# Patient Record
Sex: MALE | Race: WHITE | NOT HISPANIC OR LATINO
[De-identification: names, ages, dates, MRNs, and addresses within clinical notes are randomized per-mention and may not be internally consistent; named-entity substitution may affect disease eponyms.]

---

## 2017-03-02 ENCOUNTER — APPOINTMENT (OUTPATIENT)
Dept: ORTHOPEDIC SURGERY | Facility: CLINIC | Age: 62
End: 2017-03-02

## 2017-03-06 ENCOUNTER — RESULT REVIEW (OUTPATIENT)
Age: 62
End: 2017-03-06

## 2017-03-21 ENCOUNTER — RX RENEWAL (OUTPATIENT)
Age: 62
End: 2017-03-21

## 2017-05-19 ENCOUNTER — OUTPATIENT (OUTPATIENT)
Dept: OUTPATIENT SERVICES | Facility: HOSPITAL | Age: 62
LOS: 1 days | End: 2017-05-19
Payer: COMMERCIAL

## 2017-05-19 ENCOUNTER — APPOINTMENT (OUTPATIENT)
Dept: MRI IMAGING | Facility: CLINIC | Age: 62
End: 2017-05-19

## 2017-05-19 DIAGNOSIS — Z00.8 ENCOUNTER FOR OTHER GENERAL EXAMINATION: ICD-10-CM

## 2017-05-19 DIAGNOSIS — Z98.89 OTHER SPECIFIED POSTPROCEDURAL STATES: Chronic | ICD-10-CM

## 2017-05-19 PROCEDURE — 72148 MRI LUMBAR SPINE W/O DYE: CPT | Mod: 26

## 2017-05-19 PROCEDURE — 72148 MRI LUMBAR SPINE W/O DYE: CPT

## 2017-06-02 ENCOUNTER — OUTPATIENT (OUTPATIENT)
Dept: OUTPATIENT SERVICES | Facility: HOSPITAL | Age: 62
LOS: 1 days | Discharge: HOME | End: 2017-06-02

## 2017-06-02 DIAGNOSIS — Z86.010 PERSONAL HISTORY OF COLONIC POLYPS: ICD-10-CM

## 2017-06-02 DIAGNOSIS — Z98.89 OTHER SPECIFIED POSTPROCEDURAL STATES: Chronic | ICD-10-CM

## 2017-06-02 LAB
ALBUMIN SERPL ELPH-MCNC: 3.8 G/DL — SIGNIFICANT CHANGE UP (ref 3.3–5)
ALP SERPL-CCNC: 83 U/L — SIGNIFICANT CHANGE UP (ref 40–120)
ALT FLD-CCNC: 49 U/L — SIGNIFICANT CHANGE UP (ref 12–78)
ANION GAP SERPL CALC-SCNC: 4 MMOL/L — LOW (ref 5–17)
AST SERPL-CCNC: 27 U/L — SIGNIFICANT CHANGE UP (ref 15–37)
BASOPHILS # BLD AUTO: 0.1 K/UL — SIGNIFICANT CHANGE UP (ref 0–0.2)
BASOPHILS NFR BLD AUTO: 1.3 % — SIGNIFICANT CHANGE UP (ref 0–2)
BILIRUB SERPL-MCNC: 0.8 MG/DL — SIGNIFICANT CHANGE UP (ref 0.2–1.2)
BUN SERPL-MCNC: 24 MG/DL — HIGH (ref 7–23)
CALCIUM SERPL-MCNC: 8.4 MG/DL — LOW (ref 8.5–10.1)
CHLORIDE SERPL-SCNC: 104 MMOL/L — SIGNIFICANT CHANGE UP (ref 96–108)
CO2 SERPL-SCNC: 32 MMOL/L — HIGH (ref 22–31)
CREAT SERPL-MCNC: 1.11 MG/DL — SIGNIFICANT CHANGE UP (ref 0.5–1.3)
EOSINOPHIL # BLD AUTO: 0.1 K/UL — SIGNIFICANT CHANGE UP (ref 0–0.5)
EOSINOPHIL NFR BLD AUTO: 1.6 % — SIGNIFICANT CHANGE UP (ref 0–6)
GLUCOSE SERPL-MCNC: 97 MG/DL — SIGNIFICANT CHANGE UP (ref 70–99)
HCT VFR BLD CALC: 46.3 % — SIGNIFICANT CHANGE UP (ref 39–50)
HGB BLD-MCNC: 16.1 G/DL — SIGNIFICANT CHANGE UP (ref 13–17)
LYMPHOCYTES # BLD AUTO: 1.7 K/UL — SIGNIFICANT CHANGE UP (ref 1–3.3)
LYMPHOCYTES # BLD AUTO: 19.6 % — SIGNIFICANT CHANGE UP (ref 13–44)
MCHC RBC-ENTMCNC: 33.8 PG — SIGNIFICANT CHANGE UP (ref 27–34)
MCHC RBC-ENTMCNC: 34.8 GM/DL — SIGNIFICANT CHANGE UP (ref 32–36)
MCV RBC AUTO: 97.2 FL — SIGNIFICANT CHANGE UP (ref 80–100)
MONOCYTES # BLD AUTO: 1 K/UL — HIGH (ref 0–0.9)
MONOCYTES NFR BLD AUTO: 11.6 % — SIGNIFICANT CHANGE UP (ref 2–14)
NEUTROPHILS # BLD AUTO: 5.8 K/UL — SIGNIFICANT CHANGE UP (ref 1.8–7.4)
NEUTROPHILS NFR BLD AUTO: 65.8 % — SIGNIFICANT CHANGE UP (ref 43–77)
PLATELET # BLD AUTO: 137 K/UL — LOW (ref 150–400)
POTASSIUM SERPL-MCNC: 4.5 MMOL/L — SIGNIFICANT CHANGE UP (ref 3.5–5.3)
POTASSIUM SERPL-SCNC: 4.5 MMOL/L — SIGNIFICANT CHANGE UP (ref 3.5–5.3)
PROT SERPL-MCNC: 6.8 GM/DL — SIGNIFICANT CHANGE UP (ref 6–8.3)
RBC # BLD: 4.76 M/UL — SIGNIFICANT CHANGE UP (ref 4.2–5.8)
RBC # FLD: 12.5 % — SIGNIFICANT CHANGE UP (ref 10.3–14.5)
SODIUM SERPL-SCNC: 140 MMOL/L — SIGNIFICANT CHANGE UP (ref 135–145)
WBC # BLD: 8.8 K/UL — SIGNIFICANT CHANGE UP (ref 3.8–10.5)
WBC # FLD AUTO: 8.8 K/UL — SIGNIFICANT CHANGE UP (ref 3.8–10.5)

## 2017-06-09 ENCOUNTER — RESULT REVIEW (OUTPATIENT)
Age: 62
End: 2017-06-09

## 2017-06-09 ENCOUNTER — OUTPATIENT (OUTPATIENT)
Dept: OUTPATIENT SERVICES | Facility: HOSPITAL | Age: 62
LOS: 1 days | Discharge: HOME | End: 2017-06-09
Payer: COMMERCIAL

## 2017-06-09 VITALS
TEMPERATURE: 98 F | HEIGHT: 69 IN | OXYGEN SATURATION: 96 % | SYSTOLIC BLOOD PRESSURE: 122 MMHG | HEART RATE: 71 BPM | DIASTOLIC BLOOD PRESSURE: 81 MMHG | WEIGHT: 182.98 LBS | RESPIRATION RATE: 21 BRPM

## 2017-06-09 DIAGNOSIS — Z98.89 OTHER SPECIFIED POSTPROCEDURAL STATES: Chronic | ICD-10-CM

## 2017-06-09 PROCEDURE — 88305 TISSUE EXAM BY PATHOLOGIST: CPT | Mod: 26

## 2017-06-09 RX ORDER — ASPIRIN/CALCIUM CARB/MAGNESIUM 324 MG
81 TABLET ORAL ONCE
Qty: 0 | Refills: 0 | Status: COMPLETED | OUTPATIENT
Start: 2017-06-09 | End: 2017-06-09

## 2017-06-09 RX ORDER — SODIUM CHLORIDE 9 MG/ML
1000 INJECTION INTRAMUSCULAR; INTRAVENOUS; SUBCUTANEOUS
Qty: 0 | Refills: 0 | Status: DISCONTINUED | OUTPATIENT
Start: 2017-06-09 | End: 2017-06-24

## 2017-06-09 RX ADMIN — Medication 81 MILLIGRAM(S): at 12:23

## 2017-06-09 NOTE — ASU PATIENT PROFILE, ADULT - PMH
Asthma  history of seasonal allergies  Cholesterol serum elevated    Elevated CPK  patient has history of elevated cpk in past. Patient monitored by PMD routinely . Last lab was in May level waswith in normal limits  Heart attack    Lethargy  patient takes nuvigil  Sleep apnea  uses c-pap

## 2017-06-13 DIAGNOSIS — K64.8 OTHER HEMORRHOIDS: ICD-10-CM

## 2017-06-13 DIAGNOSIS — Z95.5 PRESENCE OF CORONARY ANGIOPLASTY IMPLANT AND GRAFT: ICD-10-CM

## 2017-06-13 DIAGNOSIS — Z12.11 ENCOUNTER FOR SCREENING FOR MALIGNANT NEOPLASM OF COLON: ICD-10-CM

## 2017-06-13 DIAGNOSIS — K57.30 DIVERTICULOSIS OF LARGE INTESTINE WITHOUT PERFORATION OR ABSCESS WITHOUT BLEEDING: ICD-10-CM

## 2017-06-13 DIAGNOSIS — D12.0 BENIGN NEOPLASM OF CECUM: ICD-10-CM

## 2017-06-13 DIAGNOSIS — G47.33 OBSTRUCTIVE SLEEP APNEA (ADULT) (PEDIATRIC): ICD-10-CM

## 2017-06-13 DIAGNOSIS — D12.2 BENIGN NEOPLASM OF ASCENDING COLON: ICD-10-CM

## 2017-06-13 DIAGNOSIS — D12.3 BENIGN NEOPLASM OF TRANSVERSE COLON: ICD-10-CM

## 2017-06-13 DIAGNOSIS — I25.10 ATHEROSCLEROTIC HEART DISEASE OF NATIVE CORONARY ARTERY WITHOUT ANGINA PECTORIS: ICD-10-CM

## 2017-06-13 DIAGNOSIS — Z79.82 LONG TERM (CURRENT) USE OF ASPIRIN: ICD-10-CM

## 2017-06-13 DIAGNOSIS — I25.2 OLD MYOCARDIAL INFARCTION: ICD-10-CM

## 2017-06-13 LAB — SURGICAL PATHOLOGY FINAL REPORT - CH: SIGNIFICANT CHANGE UP

## 2017-09-21 ENCOUNTER — APPOINTMENT (OUTPATIENT)
Dept: ORTHOPEDIC SURGERY | Facility: CLINIC | Age: 62
End: 2017-09-21
Payer: COMMERCIAL

## 2017-09-21 PROCEDURE — 20550 NJX 1 TENDON SHEATH/LIGAMENT: CPT | Mod: RT

## 2017-09-21 PROCEDURE — 99214 OFFICE O/P EST MOD 30 MIN: CPT | Mod: 25

## 2017-11-09 ENCOUNTER — APPOINTMENT (OUTPATIENT)
Dept: ORTHOPEDIC SURGERY | Facility: CLINIC | Age: 62
End: 2017-11-09
Payer: COMMERCIAL

## 2017-11-09 PROCEDURE — 99214 OFFICE O/P EST MOD 30 MIN: CPT | Mod: 25

## 2017-11-09 PROCEDURE — 20550 NJX 1 TENDON SHEATH/LIGAMENT: CPT | Mod: F3

## 2018-03-01 ENCOUNTER — APPOINTMENT (OUTPATIENT)
Dept: ORTHOPEDIC SURGERY | Facility: CLINIC | Age: 63
End: 2018-03-01
Payer: COMMERCIAL

## 2018-03-01 PROCEDURE — 99214 OFFICE O/P EST MOD 30 MIN: CPT

## 2018-03-01 PROCEDURE — 73110 X-RAY EXAM OF WRIST: CPT | Mod: LT

## 2018-04-17 ENCOUNTER — APPOINTMENT (OUTPATIENT)
Dept: ORTHOPEDIC SURGERY | Facility: CLINIC | Age: 63
End: 2018-04-17
Payer: MEDICARE

## 2018-04-17 DIAGNOSIS — M18.11 UNILATERAL PRIMARY OSTEOARTHRITIS OF FIRST CARPOMETACARPAL JOINT, RIGHT HAND: ICD-10-CM

## 2018-04-17 DIAGNOSIS — M65.332 TRIGGER FINGER, LEFT MIDDLE FINGER: ICD-10-CM

## 2018-04-17 DIAGNOSIS — M65.331 TRIGGER FINGER, RIGHT MIDDLE FINGER: ICD-10-CM

## 2018-04-17 DIAGNOSIS — M18.12 UNILATERAL PRIMARY OSTEOARTHRITIS OF FIRST CARPOMETACARPAL JOINT, LEFT HAND: ICD-10-CM

## 2018-04-17 DIAGNOSIS — M65.342 TRIGGER FINGER, LEFT RING FINGER: ICD-10-CM

## 2018-04-17 PROCEDURE — 99213 OFFICE O/P EST LOW 20 MIN: CPT

## 2018-04-18 RX ORDER — ATORVASTATIN CALCIUM 80 MG/1
80 TABLET, FILM COATED ORAL
Qty: 30 | Refills: 0 | Status: ACTIVE | COMMUNITY
Start: 2018-03-02

## 2018-06-15 ENCOUNTER — TRANSCRIPTION ENCOUNTER (OUTPATIENT)
Age: 63
End: 2018-06-15

## 2018-06-15 ENCOUNTER — INPATIENT (INPATIENT)
Facility: HOSPITAL | Age: 63
LOS: 0 days | Discharge: HOME | DRG: 247 | End: 2018-06-16
Attending: INTERNAL MEDICINE | Admitting: INTERNAL MEDICINE
Payer: MEDICARE

## 2018-06-15 VITALS
SYSTOLIC BLOOD PRESSURE: 124 MMHG | HEART RATE: 67 BPM | TEMPERATURE: 98 F | RESPIRATION RATE: 18 BRPM | HEIGHT: 69 IN | WEIGHT: 179.9 LBS | DIASTOLIC BLOOD PRESSURE: 90 MMHG | OXYGEN SATURATION: 97 %

## 2018-06-15 DIAGNOSIS — Z98.89 OTHER SPECIFIED POSTPROCEDURAL STATES: Chronic | ICD-10-CM

## 2018-06-15 DIAGNOSIS — I25.10 ATHEROSCLEROTIC HEART DISEASE OF NATIVE CORONARY ARTERY WITHOUT ANGINA PECTORIS: ICD-10-CM

## 2018-06-15 LAB
ALBUMIN SERPL ELPH-MCNC: 4.3 G/DL — SIGNIFICANT CHANGE UP (ref 3.3–5)
ALP SERPL-CCNC: 103 U/L — SIGNIFICANT CHANGE UP (ref 40–120)
ALT FLD-CCNC: 40 U/L — SIGNIFICANT CHANGE UP (ref 10–45)
ANION GAP SERPL CALC-SCNC: 11 MMOL/L — SIGNIFICANT CHANGE UP (ref 5–17)
ANION GAP SERPL CALC-SCNC: 12 MMOL/L — SIGNIFICANT CHANGE UP (ref 5–17)
AST SERPL-CCNC: 38 U/L — SIGNIFICANT CHANGE UP (ref 10–40)
BILIRUB SERPL-MCNC: 0.6 MG/DL — SIGNIFICANT CHANGE UP (ref 0.2–1.2)
BUN SERPL-MCNC: 26 MG/DL — HIGH (ref 7–23)
BUN SERPL-MCNC: 26 MG/DL — HIGH (ref 7–23)
CALCIUM SERPL-MCNC: 8.7 MG/DL — SIGNIFICANT CHANGE UP (ref 8.4–10.5)
CALCIUM SERPL-MCNC: 9.3 MG/DL — SIGNIFICANT CHANGE UP (ref 8.4–10.5)
CHLORIDE SERPL-SCNC: 103 MMOL/L — SIGNIFICANT CHANGE UP (ref 96–108)
CHLORIDE SERPL-SCNC: 103 MMOL/L — SIGNIFICANT CHANGE UP (ref 96–108)
CO2 SERPL-SCNC: 26 MMOL/L — SIGNIFICANT CHANGE UP (ref 22–31)
CO2 SERPL-SCNC: 27 MMOL/L — SIGNIFICANT CHANGE UP (ref 22–31)
CREAT SERPL-MCNC: 1.11 MG/DL — SIGNIFICANT CHANGE UP (ref 0.5–1.3)
CREAT SERPL-MCNC: 1.2 MG/DL — SIGNIFICANT CHANGE UP (ref 0.5–1.3)
GLUCOSE SERPL-MCNC: 103 MG/DL — HIGH (ref 70–99)
GLUCOSE SERPL-MCNC: 106 MG/DL — HIGH (ref 70–99)
HCT VFR BLD CALC: 47.6 % — SIGNIFICANT CHANGE UP (ref 39–50)
HGB BLD-MCNC: 15.9 G/DL — SIGNIFICANT CHANGE UP (ref 13–17)
MCHC RBC-ENTMCNC: 33.3 PG — SIGNIFICANT CHANGE UP (ref 27–34)
MCHC RBC-ENTMCNC: 33.4 GM/DL — SIGNIFICANT CHANGE UP (ref 32–36)
MCV RBC AUTO: 99.9 FL — SIGNIFICANT CHANGE UP (ref 80–100)
PLATELET # BLD AUTO: 157 K/UL — SIGNIFICANT CHANGE UP (ref 150–400)
POTASSIUM SERPL-MCNC: 4.5 MMOL/L — SIGNIFICANT CHANGE UP (ref 3.5–5.3)
POTASSIUM SERPL-MCNC: 4.6 MMOL/L — SIGNIFICANT CHANGE UP (ref 3.5–5.3)
POTASSIUM SERPL-SCNC: 4.5 MMOL/L — SIGNIFICANT CHANGE UP (ref 3.5–5.3)
POTASSIUM SERPL-SCNC: 4.6 MMOL/L — SIGNIFICANT CHANGE UP (ref 3.5–5.3)
PROT SERPL-MCNC: 7.5 G/DL — SIGNIFICANT CHANGE UP (ref 6–8.3)
RBC # BLD: 4.77 M/UL — SIGNIFICANT CHANGE UP (ref 4.2–5.8)
RBC # FLD: 12.9 % — SIGNIFICANT CHANGE UP (ref 10.3–14.5)
SODIUM SERPL-SCNC: 140 MMOL/L — SIGNIFICANT CHANGE UP (ref 135–145)
SODIUM SERPL-SCNC: 142 MMOL/L — SIGNIFICANT CHANGE UP (ref 135–145)
WBC # BLD: 9.3 K/UL — SIGNIFICANT CHANGE UP (ref 3.8–10.5)
WBC # FLD AUTO: 9.3 K/UL — SIGNIFICANT CHANGE UP (ref 3.8–10.5)

## 2018-06-15 PROCEDURE — 93010 ELECTROCARDIOGRAM REPORT: CPT

## 2018-06-15 PROCEDURE — 92928 PRQ TCAT PLMT NTRAC ST 1 LES: CPT | Mod: RC

## 2018-06-15 PROCEDURE — 93458 L HRT ARTERY/VENTRICLE ANGIO: CPT | Mod: 26,59

## 2018-06-15 PROCEDURE — 99152 MOD SED SAME PHYS/QHP 5/>YRS: CPT

## 2018-06-15 RX ORDER — ATORVASTATIN CALCIUM 80 MG/1
1 TABLET, FILM COATED ORAL
Qty: 0 | Refills: 0 | COMMUNITY

## 2018-06-15 RX ORDER — METOPROLOL TARTRATE 50 MG
25 TABLET ORAL
Qty: 0 | Refills: 0 | Status: DISCONTINUED | OUTPATIENT
Start: 2018-06-15 | End: 2018-06-15

## 2018-06-15 RX ORDER — ALBUTEROL 90 UG/1
2 AEROSOL, METERED ORAL EVERY 6 HOURS
Qty: 0 | Refills: 0 | Status: DISCONTINUED | OUTPATIENT
Start: 2018-06-15 | End: 2018-06-16

## 2018-06-15 RX ORDER — AMOXICILLIN 250 MG/5ML
500 SUSPENSION, RECONSTITUTED, ORAL (ML) ORAL THREE TIMES A DAY
Qty: 0 | Refills: 0 | Status: DISCONTINUED | OUTPATIENT
Start: 2018-06-15 | End: 2018-06-16

## 2018-06-15 RX ORDER — AMOXICILLIN 250 MG/5ML
1 SUSPENSION, RECONSTITUTED, ORAL (ML) ORAL
Qty: 0 | Refills: 0 | COMMUNITY

## 2018-06-15 RX ORDER — METOPROLOL TARTRATE 50 MG
25 TABLET ORAL DAILY
Qty: 0 | Refills: 0 | Status: DISCONTINUED | OUTPATIENT
Start: 2018-06-15 | End: 2018-06-15

## 2018-06-15 RX ORDER — METOPROLOL TARTRATE 50 MG
12.5 TABLET ORAL DAILY
Qty: 0 | Refills: 0 | Status: DISCONTINUED | OUTPATIENT
Start: 2018-06-15 | End: 2018-06-15

## 2018-06-15 RX ORDER — MONTELUKAST 4 MG/1
10 TABLET, CHEWABLE ORAL DAILY
Qty: 0 | Refills: 0 | Status: DISCONTINUED | OUTPATIENT
Start: 2018-06-15 | End: 2018-06-16

## 2018-06-15 RX ORDER — METOPROLOL TARTRATE 50 MG
1 TABLET ORAL
Qty: 0 | Refills: 0 | COMMUNITY

## 2018-06-15 RX ORDER — ASPIRIN/CALCIUM CARB/MAGNESIUM 324 MG
81 TABLET ORAL DAILY
Qty: 0 | Refills: 0 | Status: DISCONTINUED | OUTPATIENT
Start: 2018-06-15 | End: 2018-06-16

## 2018-06-15 RX ORDER — MOMETASONE FUROATE 220 UG/1
2 INHALANT RESPIRATORY (INHALATION) DAILY
Qty: 0 | Refills: 0 | Status: DISCONTINUED | OUTPATIENT
Start: 2018-06-15 | End: 2018-06-16

## 2018-06-15 RX ORDER — ATORVASTATIN CALCIUM 80 MG/1
1 TABLET, FILM COATED ORAL
Qty: 30 | Refills: 1
Start: 2018-06-15 | End: 2018-08-13

## 2018-06-15 RX ORDER — METOPROLOL TARTRATE 50 MG
12.5 TABLET ORAL
Qty: 0 | Refills: 0 | Status: DISCONTINUED | OUTPATIENT
Start: 2018-06-15 | End: 2018-06-16

## 2018-06-15 RX ORDER — CLOPIDOGREL BISULFATE 75 MG/1
75 TABLET, FILM COATED ORAL DAILY
Qty: 0 | Refills: 0 | Status: DISCONTINUED | OUTPATIENT
Start: 2018-06-16 | End: 2018-06-16

## 2018-06-15 RX ORDER — FLUTICASONE PROPIONATE AND SALMETEROL 50; 250 UG/1; UG/1
0 POWDER ORAL; RESPIRATORY (INHALATION)
Qty: 0 | Refills: 0 | COMMUNITY

## 2018-06-15 RX ORDER — ATORVASTATIN CALCIUM 80 MG/1
80 TABLET, FILM COATED ORAL AT BEDTIME
Qty: 0 | Refills: 0 | Status: DISCONTINUED | OUTPATIENT
Start: 2018-06-15 | End: 2018-06-16

## 2018-06-15 RX ORDER — ASPIRIN/CALCIUM CARB/MAGNESIUM 324 MG
1 TABLET ORAL
Qty: 30 | Refills: 2
Start: 2018-06-15 | End: 2018-09-12

## 2018-06-15 RX ORDER — OXYCODONE AND ACETAMINOPHEN 5; 325 MG/1; MG/1
1 TABLET ORAL ONCE
Qty: 0 | Refills: 0 | Status: DISCONTINUED | OUTPATIENT
Start: 2018-06-15 | End: 2018-06-15

## 2018-06-15 RX ORDER — CLOPIDOGREL BISULFATE 75 MG/1
1 TABLET, FILM COATED ORAL
Qty: 30 | Refills: 11
Start: 2018-06-15 | End: 2019-06-09

## 2018-06-15 RX ORDER — FAMOTIDINE 10 MG/ML
20 INJECTION INTRAVENOUS DAILY
Qty: 0 | Refills: 0 | Status: DISCONTINUED | OUTPATIENT
Start: 2018-06-15 | End: 2018-06-16

## 2018-06-15 RX ADMIN — Medication 12.5 MILLIGRAM(S): at 17:50

## 2018-06-15 RX ADMIN — Medication 500 MILLIGRAM(S): at 17:51

## 2018-06-15 RX ADMIN — Medication 30 MILLILITER(S): at 16:34

## 2018-06-15 RX ADMIN — ATORVASTATIN CALCIUM 80 MILLIGRAM(S): 80 TABLET, FILM COATED ORAL at 20:31

## 2018-06-15 NOTE — H&P CARDIOLOGY - FAMILY HISTORY
Father  Still living? Unknown  Family history of sudden death in father, Age at diagnosis: Age Unknown

## 2018-06-15 NOTE — DISCHARGE NOTE ADULT - PLAN OF CARE
Pt remains chest pain free and understands post cath discharge instructions No heavy lifting or pushing/pulling with procedure arm for 2 weeks. No driving for 2 days. You may shower 24 hours following the procedure but avoid baths/swimming for 1 week. Check your wrist site for bleeding and/or swelling daily following procedure and call your doctor immediately if it occurs or if you experience increased pain at the site. Follow up with your cardiologist in 1-2 weeks. You may call [facility] if you have any questions/concerns regarding your procedure [phone]. Your LDL cholesterol will be less than 70mg/dL Continue with your cholesterol medications. Eat a heart healthy diet that is low in saturated fats and salt, and includes whole grains, fruits, vegetables and lean protein; exercise regularly (consult with your physician or cardiologist first); maintain a heart healthy weight. Continue to follow with your primary physician or cardiologist for treatment goals, continue medication, have liver function testing every 3 months as anti lipid medications can cause liver irritation. If you smoke - quit (A resource to help you stop smoking is the [facility] – phone number [phone].).

## 2018-06-15 NOTE — H&P CARDIOLOGY - PMH
Asthma    Cholesterol serum elevated    Coronary artery disease due to lipid rich plaque    Lethargy  patient takes nuvigil  Sleep apnea  uses c-pap  ST elevation myocardial infarction (STEMI), unspecified artery    Stented coronary artery

## 2018-06-15 NOTE — DISCHARGE NOTE ADULT - CARE PLAN
Principal Discharge DX:	CAD (coronary artery disease)  Goal:	Pt remains chest pain free and understands post cath discharge instructions  Assessment and plan of treatment:	No heavy lifting or pushing/pulling with procedure arm for 2 weeks. No driving for 2 days. You may shower 24 hours following the procedure but avoid baths/swimming for 1 week. Check your wrist site for bleeding and/or swelling daily following procedure and call your doctor immediately if it occurs or if you experience increased pain at the site. Follow up with your cardiologist in 1-2 weeks. You may call [facility] if you have any questions/concerns regarding your procedure [phone].  Secondary Diagnosis:	Hyperlipidemia  Goal:	Your LDL cholesterol will be less than 70mg/dL  Assessment and plan of treatment:	Continue with your cholesterol medications. Eat a heart healthy diet that is low in saturated fats and salt, and includes whole grains, fruits, vegetables and lean protein; exercise regularly (consult with your physician or cardiologist first); maintain a heart healthy weight. Continue to follow with your primary physician or cardiologist for treatment goals, continue medication, have liver function testing every 3 months as anti lipid medications can cause liver irritation. If you smoke - quit (A resource to help you stop smoking is the [facility] – phone number [phone].).

## 2018-06-15 NOTE — DISCHARGE NOTE ADULT - PATIENT PORTAL LINK FT
You can access the [facility], offered by [facility], by registering with the following website: [email or web address]

## 2018-06-15 NOTE — DISCHARGE NOTE ADULT - MEDICATION SUMMARY - MEDICATIONS TO TAKE
I will START or STAY ON the medications listed below when I get home from the hospital:    aspirin 81 mg oral delayed release tablet  -- 1 tab(s) by mouth once a day  -- Indication: For TO KEEP STENT OPEN    atorvastatin 80 mg oral tablet  -- 1 tab(s) by mouth once a day  -- Indication: For High cholesterol     clopidogrel 75 mg oral tablet  -- 1 tab(s) by mouth once a day  -- Indication: For TO KEEP STENT OPEN     Metoprolol Succinate ER 25 mg oral tablet, extended release  -- 0.5 tab(s) by mouth 2 times a day  -- Indication: For High blood pressure     albuterol 90 mcg/inh inhalation aerosol  -- 2 puff(s) inhaled every 4 hours, As Needed  -- Indication: For Asthma     Nuvigil 150 mg oral tablet  -- 0.5 tab(s) by mouth once a day  -- Indication: For Sleepiness    montelukast 10 mg oral tablet  -- 1 tab(s) by mouth once a day  -- Indication: For Asthma    Asmanex Twisthaler 120 Dose 220 mcg/inh inhalation aerosol powder  -- 2 puff(s) inhaled once a day  -- Indication: For Asthma

## 2018-06-15 NOTE — H&P CARDIOLOGY - HISTORY OF PRESENT ILLNESS
62 y/o male with PMHx of Asthma - never intubated, HLD, CAROLYN on CPAP, Chronic Lethargy, CAD - STEMI in 2016 s/p GAURAV to the [facility], cath at that time showed nonobstructive disease to the LAD and RCA who was evaluated by Cardiology - Dr. Conde on 6/14/2018 2/2 c/o episodes of sharp midsternal chest pain (waking him out of his sleep at times) with onset 3 weeks ago.  Patient states pain usually lasts about an hour, he denies accompanying symptoms.  Given patient hx decision was made to proceed for [facility] for which he presents today. 64 y/o male with PMHx of Asthma - never intubated, HLD, CAROLYN on CPAP, Chronic Lethargy, CAD - STEMI in 2016 s/p GAURAV to the [facility], cath at that time showed nonobstructive disease to the LAD and RCA who was evaluated by Cardiology - Dr. Conde on 6/14/2018 2/2 c/o episodes of sharp midsternal chest pain (waking him out of his sleep at times) with onset 3 weeks ago.  Patient states pain usually lasts about an hour, he denies accompanying symptoms.  Given patient hx decision was made to proceed for [facility] for which he presents today.        Of note pt had tooth extracted 6/13/2018 and is on Amoxicillin 500 mg po bid x 3 days

## 2018-06-15 NOTE — DISCHARGE NOTE ADULT - ADDITIONAL INSTRUCTIONS
Follow-up with your cardiologist inn 1-2 weeks   Do not stop you Aspirin or Plavix unless instructed to do so by your cardiologist.

## 2018-06-15 NOTE — DISCHARGE NOTE ADULT - CARE PROVIDER_API CALL
Indra Conde (MD), Cardiovascular Disease  [address]  [address] [place] [address]  Phone: [phone]  Fax: [phone]

## 2018-06-15 NOTE — DISCHARGE NOTE ADULT - HOSPITAL COURSE
62 y/o male with PMHx of Asthma - never intubated, HLD, CAROLYN on CPAP, Chronic Lethargy, CAD - STEMI in 2016 s/p GAURAV to the [facility], cath at that time showed nonobstructive disease to the LAD and RCA who was evaluated by Cardiology - Dr. Conde on 6/14/2018 2/2 c/o episodes of sharp midsternal chest pain (waking him out of his sleep at times) with onset 3 weeks ago.  Patient states pain usually lasts about an hour, he denies accompanying symptoms.  Given patient hx decision was made to proceed for [facility] for which he presents today. Pt is now s/p cardiac cath GAURAV x 1mLAD and GAURAV x 1 pRCA via right radial artery access. Pt tolerated the procedure well, cardiac cath site benign. Post-procedure discharge instructions discussed and questions addressed.      Of note pt had tooth extracted 6/13/2018 and is on Amoxicillin 500 mg po bid x 3 days

## 2018-06-15 NOTE — CHART NOTE - NSCHARTNOTEFT_GEN_A_CORE
Patient underwent a PCI procedure and is being admitted as they are at increased risk for major adverse cardiac and vascular events if discharged due to the following high risk characteristics: ACS/UA, performed >=2 vessel system PCI; no reflow requiring treatment.

## 2018-06-16 VITALS — HEART RATE: 76 BPM | OXYGEN SATURATION: 96 %

## 2018-06-16 LAB
HCT VFR BLD CALC: 43.3 % — SIGNIFICANT CHANGE UP (ref 39–50)
HGB BLD-MCNC: 15 G/DL — SIGNIFICANT CHANGE UP (ref 13–17)
MCHC RBC-ENTMCNC: 34.6 PG — HIGH (ref 27–34)
MCHC RBC-ENTMCNC: 34.7 GM/DL — SIGNIFICANT CHANGE UP (ref 32–36)
MCV RBC AUTO: 99.7 FL — SIGNIFICANT CHANGE UP (ref 80–100)
PLATELET # BLD AUTO: 146 K/UL — LOW (ref 150–400)
RBC # BLD: 4.34 M/UL — SIGNIFICANT CHANGE UP (ref 4.2–5.8)
RBC # FLD: 12.8 % — SIGNIFICANT CHANGE UP (ref 10.3–14.5)
WBC # BLD: 9 K/UL — SIGNIFICANT CHANGE UP (ref 3.8–10.5)
WBC # FLD AUTO: 9 K/UL — SIGNIFICANT CHANGE UP (ref 3.8–10.5)

## 2018-06-16 PROCEDURE — C1725: CPT

## 2018-06-16 PROCEDURE — 99152 MOD SED SAME PHYS/QHP 5/>YRS: CPT

## 2018-06-16 PROCEDURE — 93005 ELECTROCARDIOGRAM TRACING: CPT

## 2018-06-16 PROCEDURE — 80048 BASIC METABOLIC PNL TOTAL CA: CPT

## 2018-06-16 PROCEDURE — C1874: CPT

## 2018-06-16 PROCEDURE — 99153 MOD SED SAME PHYS/QHP EA: CPT

## 2018-06-16 PROCEDURE — 93458 L HRT ARTERY/VENTRICLE ANGIO: CPT | Mod: 59

## 2018-06-16 PROCEDURE — 85027 COMPLETE CBC AUTOMATED: CPT

## 2018-06-16 PROCEDURE — C1769: CPT

## 2018-06-16 PROCEDURE — 94660 CPAP INITIATION&MGMT: CPT

## 2018-06-16 PROCEDURE — C1887: CPT

## 2018-06-16 PROCEDURE — C9600: CPT | Mod: LD

## 2018-06-16 PROCEDURE — C1894: CPT

## 2018-06-16 PROCEDURE — 80053 COMPREHEN METABOLIC PANEL: CPT

## 2018-06-16 RX ADMIN — Medication 81 MILLIGRAM(S): at 05:53

## 2018-06-16 RX ADMIN — Medication 12.5 MILLIGRAM(S): at 05:52

## 2018-06-16 RX ADMIN — Medication 500 MILLIGRAM(S): at 04:00

## 2018-06-16 RX ADMIN — CLOPIDOGREL BISULFATE 75 MILLIGRAM(S): 75 TABLET, FILM COATED ORAL at 05:53

## 2018-06-16 RX ADMIN — FAMOTIDINE 20 MILLIGRAM(S): 10 INJECTION INTRAVENOUS at 05:54

## 2018-06-16 NOTE — PROGRESS NOTE ADULT - SUBJECTIVE AND OBJECTIVE BOX
Patient is a 63y old  Male who presents with a chief complaint of chest pain (15 Héctor 2018 20:21)          Allergies    dust (Unknown)  hay fever (Unknown)  No Known Drug Allergies    Intolerances        Medications:  ALBUTerol    90 MICROgram(s) HFA Inhaler 2 Puff(s) Inhalation every 6 hours PRN  amoxicillin      Capsule 500 milliGRAM(s) Oral three times a day  aspirin enteric coated 81 milliGRAM(s) Oral daily  atorvastatin 80 milliGRAM(s) Oral at bedtime  clopidogrel Tablet 75 milliGRAM(s) Oral daily  famotidine    Tablet 20 milliGRAM(s) Oral daily  metoprolol succinate ER 12.5 milliGRAM(s) Oral two times a day  mometasone 220 MICROgram(s) Inhaler 2 Puff(s) Inhalation daily  montelukast 10 milliGRAM(s) Oral daily      Vitals:  T(C): 36.6 (06-15-18 @ 12:09), Max: 36.6 (06-15-18 @ 12:09)  HR: 73 (06-15-18 @ 19:35) (67 - 82)  BP: 117/75 (06-15-18 @ 19:35) (93/72 - 135/80)  BP(mean): 101 (06-15-18 @ 12:09) (101 - 101)  RR: 16 (06-15-18 @ 19:35) (16 - 18)  SpO2: 97% (06-15-18 @ 19:35) (96% - 97%)  Wt(kg): --  Daily Height in cm: 175.26 (15 Héctor 2018 12:09)    Daily Weight in k.6 (15 Héctor 2018 12:09)  I&O's Summary    15 Héctor 2018 07:01  -  2018 01:07  --------------------------------------------------------  IN: 480 mL / OUT: 0 mL / NET: 480 mL          Physical Exam:  Appearance: Normal  Eyes: PERRL, EOMI  Procedural Access Site: Right radial artery access site. No hematoma, Non-tender to palpation, 2+ pulse, No bruit, No Ecchymosis  Musculoskeletal: No clubbing, No joint deformity   Neurologic: Non-focal  Psychiatry: AAOx3, Mood & affect appropriate  Skin: No rashes, No ecchymoses, No cyanosis    06-15    140  |  103  |  26<H>  ----------------------------<  106<H>  4.5   |  26  |  1.11    Ca    8.7      15 Héctor 2018 23:31    TPro  7.5  /  Alb  4.3  /  TBili  0.6  /  DBili  x   /  AST  38  /  ALT  40  /  AlkPhos  103  06-15            Interpretation of Telemetry: 66 bpm    ASSESSMENT/PLAN  Patient is a 63y old  Male who presents with a chief complaint of chest pain (15 Héctor 2018 20:21) now s/p mLAD (80%) GAURAV x 1  pRCA (70%) GAURAV x 1. Pt tolerated the procedure well. Post-procedure discharge instruction discussed and questions address. Patient is a 63y old  Male who presents with a chief complaint of chest pain (15 Héctor 2018 20:21) now s/p LHC GAURAV x 1 mLAD and GAURAV x 1 pRCA via right radial access.           Allergies    dust (Unknown)  hay fever (Unknown)  No Known Drug Allergies    Intolerances        Medications:  ALBUTerol    90 MICROgram(s) HFA Inhaler 2 Puff(s) Inhalation every 6 hours PRN  amoxicillin      Capsule 500 milliGRAM(s) Oral three times a day  aspirin enteric coated 81 milliGRAM(s) Oral daily  atorvastatin 80 milliGRAM(s) Oral at bedtime  clopidogrel Tablet 75 milliGRAM(s) Oral daily  famotidine    Tablet 20 milliGRAM(s) Oral daily  metoprolol succinate ER 12.5 milliGRAM(s) Oral two times a day  mometasone 220 MICROgram(s) Inhaler 2 Puff(s) Inhalation daily  montelukast 10 milliGRAM(s) Oral daily      Vitals:  T(C): 36.6 (06-15-18 @ 12:09), Max: 36.6 (06-15-18 @ 12:09)  HR: 73 (06-15-18 @ 19:35) (67 - 82)  BP: 117/75 (06-15-18 @ 19:35) (93/72 - 135/80)  BP(mean): 101 (06-15-18 @ 12:09) (101 - 101)  RR: 16 (06-15-18 @ 19:35) (16 - 18)  SpO2: 97% (06-15-18 @ 19:35) (96% - 97%)  Wt(kg): --  Daily Height in cm: 175.26 (15 Héctor 2018 12:09)    Daily Weight in k.6 (15 Héctor 2018 12:09)  I&O's Summary    15 Héctor 2018 07:01  -  2018 01:07  --------------------------------------------------------  IN: 480 mL / OUT: 0 mL / NET: 480 mL          Physical Exam:  Appearance: Normal  Eyes: PERRL, EOMI  Procedural Access Site: Right radial artery access site. No hematoma, Non-tender to palpation, 2+ pulse, No bruit, No Ecchymosis  Musculoskeletal: No clubbing, No joint deformity   Neurologic: Non-focal  Psychiatry: AAOx3, Mood & affect appropriate  Skin: No rashes, No ecchymoses, No cyanosis    06-15    140  |  103  |  26<H>  ----------------------------<  106<H>  4.5   |  26  |  1.11    Ca    8.7      15 Héctor 2018 23:31    TPro  7.5  /  Alb  4.3  /  TBili  0.6  /  DBili  x   /  AST  38  /  ALT  40  /  AlkPhos  103  06-15            Interpretation of Telemetry: 66 bpm    ASSESSMENT/PLAN  Patient is a 63y old  Male who presents with a chief complaint of chest pain (15 Héctor 2018 20:21) now s/p mLAD (80%) GAURAV x 1  pRCA (70%) GAURAV x 1 via riight radial artery access. Pt tolerated the procedure well, cath site benign. Post-procedure discharge instruction discussed and questions address.

## 2019-06-06 ENCOUNTER — APPOINTMENT (OUTPATIENT)
Dept: MRI IMAGING | Facility: CLINIC | Age: 64
End: 2019-06-06
Payer: MEDICARE

## 2019-06-06 ENCOUNTER — OUTPATIENT (OUTPATIENT)
Dept: OUTPATIENT SERVICES | Facility: HOSPITAL | Age: 64
LOS: 1 days | End: 2019-06-06
Payer: MEDICARE

## 2019-06-06 DIAGNOSIS — Z98.89 OTHER SPECIFIED POSTPROCEDURAL STATES: Chronic | ICD-10-CM

## 2019-06-06 DIAGNOSIS — Z00.8 ENCOUNTER FOR OTHER GENERAL EXAMINATION: ICD-10-CM

## 2019-06-06 PROCEDURE — 72148 MRI LUMBAR SPINE W/O DYE: CPT | Mod: 26

## 2019-06-06 PROCEDURE — 72148 MRI LUMBAR SPINE W/O DYE: CPT

## 2019-10-25 ENCOUNTER — TRANSCRIPTION ENCOUNTER (OUTPATIENT)
Age: 64
End: 2019-10-25

## 2020-06-23 ENCOUNTER — APPOINTMENT (OUTPATIENT)
Dept: ULTRASOUND IMAGING | Facility: CLINIC | Age: 65
End: 2020-06-23
Payer: MEDICARE

## 2020-06-23 ENCOUNTER — OUTPATIENT (OUTPATIENT)
Dept: OUTPATIENT SERVICES | Facility: HOSPITAL | Age: 65
LOS: 1 days | End: 2020-06-23
Payer: MEDICARE

## 2020-06-23 DIAGNOSIS — Z00.8 ENCOUNTER FOR OTHER GENERAL EXAMINATION: ICD-10-CM

## 2020-06-23 DIAGNOSIS — Z98.89 OTHER SPECIFIED POSTPROCEDURAL STATES: Chronic | ICD-10-CM

## 2020-06-23 PROCEDURE — 76700 US EXAM ABDOM COMPLETE: CPT | Mod: 26

## 2020-06-23 PROCEDURE — 76700 US EXAM ABDOM COMPLETE: CPT

## 2020-06-25 ENCOUNTER — RECORD ABSTRACTING (OUTPATIENT)
Age: 65
End: 2020-06-25

## 2020-08-24 ENCOUNTER — APPOINTMENT (OUTPATIENT)
Dept: PEDIATRIC ALLERGY IMMUNOLOGY | Facility: CLINIC | Age: 65
End: 2020-08-24
Payer: MEDICARE

## 2020-08-24 VITALS — OXYGEN SATURATION: 97 % | RESPIRATION RATE: 18 BRPM | HEART RATE: 85 BPM

## 2020-08-24 DIAGNOSIS — G47.9 SLEEP DISORDER, UNSPECIFIED: ICD-10-CM

## 2020-08-24 PROCEDURE — 99214 OFFICE O/P EST MOD 30 MIN: CPT

## 2020-08-24 NOTE — REVIEW OF SYSTEMS
[Nasal Congestion] : nasal congestion [Difficulty Breathing] : dyspnea [Fatigue] : fatigue [Nl] : Integumentary [FreeTextEntry8] : Sleep Disturbances

## 2020-08-24 NOTE — HISTORY OF PRESENT ILLNESS
[Allergic Rhinitis] : allergic rhinitis [Eczematous rashes] : eczematous rashes [de-identified] : 65 year old with mild-moderate persistent asthma and long standing history of sleep apnea and sleep disorders (? narcolepsy) .  He currently is on Nuvigil which in the past has enabled him to sleep but he continually complains of daytime sleepiness. He has vague complains of ?? difficulty breathing but without wheezing, shortness of breath or cough.  It is unclear whether this represents daytime fatigue or actual airway disfunction. He is supposed to be using Arnuity 200 1p qd along with Singulair 10 mg qd but compliance is questionable.  He use ProAir PRN when he feels these respiratory complains but its effect is unclear.  He uses Flonase 2s qd but again compliance is poor..  Complaints are complicated by past history of cardiac CAD and MI-he currently takes metoprolol and Lipitor. He has sleep apnea and sleeps with CPAP

## 2020-08-24 NOTE — PHYSICAL EXAM
[Alert] : alert [Normal Pupil & Iris Size/Symmetry] : normal pupil and iris size and symmetry [No Acute Distress] : no acute distress [Sclera Not Icteric] : sclera not icteric [Wheezing] : no wheezing was heard [No Thrush] : no thrush [Normal TMs] : both tympanic membranes were normal [Normal Rate] : heart rate was normal  [Regular Rhythm] : with a regular rhythm [Normal Cervical Lymph Nodes] : cervical [Skin Intact] : skin intact

## 2020-08-24 NOTE — ASSESSMENT
[FreeTextEntry1] : 65 yr old with sleep apnea and other sleep disturbances now and persistently compalint of day time sleepiness and a vague sensation of difficulty breathing intermittently.  there is no wheezing, cough, shortness of breath that would suggest asthma although the patinet has had astma in past. There are no nocturnal awakening from wheezing and exercise is well tolerated.\par \par Suggest better compliance with asthma medications such as Arnuity. ProAir will be taken PRN. I have also suggested pt begin home PEFR monitoring to see if respiratory complaints are associated with a drop in peak flow at home He is to continue his Singualir as well\par \par Will follow up 6 weeks.\par \par Hao Smith MD, FAAP, FAAAAI\par [facility] All[facility], Asthma, & Immunology\par [facility]\par [facility]\par [facility] at [place]\par [address] [facility] [address], [place]  [address]\par [address] [place] [address], [address] [place] [address]  [address]\par [phone]\par

## 2020-08-24 NOTE — REASON FOR VISIT
[Routine Follow-Up] : a routine follow-up visit for [Asthma] : asthma [Shortness of Breath] : shortness of breath [Allergy Evaluation/ Skin Testing] : allergy evaluation and or skin testing

## 2020-09-21 ENCOUNTER — OUTPATIENT (OUTPATIENT)
Dept: OUTPATIENT SERVICES | Facility: HOSPITAL | Age: 65
LOS: 1 days | End: 2020-09-21
Payer: MEDICARE

## 2020-09-21 DIAGNOSIS — Z01.818 ENCOUNTER FOR OTHER PREPROCEDURAL EXAMINATION: ICD-10-CM

## 2020-09-21 DIAGNOSIS — Z98.89 OTHER SPECIFIED POSTPROCEDURAL STATES: Chronic | ICD-10-CM

## 2020-09-21 DIAGNOSIS — Z86.010 PERSONAL HISTORY OF COLONIC POLYPS: ICD-10-CM

## 2020-09-21 LAB
ANION GAP SERPL CALC-SCNC: 1 MMOL/L — LOW (ref 5–17)
BASOPHILS # BLD AUTO: 0.05 K/UL — SIGNIFICANT CHANGE UP (ref 0–0.2)
BASOPHILS NFR BLD AUTO: 0.5 % — SIGNIFICANT CHANGE UP (ref 0–2)
BUN SERPL-MCNC: 21 MG/DL — SIGNIFICANT CHANGE UP (ref 7–23)
CALCIUM SERPL-MCNC: 8.7 MG/DL — SIGNIFICANT CHANGE UP (ref 8.5–10.1)
CHLORIDE SERPL-SCNC: 108 MMOL/L — SIGNIFICANT CHANGE UP (ref 96–108)
CO2 SERPL-SCNC: 31 MMOL/L — SIGNIFICANT CHANGE UP (ref 22–31)
CREAT SERPL-MCNC: 1.16 MG/DL — SIGNIFICANT CHANGE UP (ref 0.5–1.3)
EOSINOPHIL # BLD AUTO: 0.08 K/UL — SIGNIFICANT CHANGE UP (ref 0–0.5)
EOSINOPHIL NFR BLD AUTO: 0.8 % — SIGNIFICANT CHANGE UP (ref 0–6)
GLUCOSE SERPL-MCNC: 113 MG/DL — HIGH (ref 70–99)
HCT VFR BLD CALC: 48.2 % — SIGNIFICANT CHANGE UP (ref 39–50)
HGB BLD-MCNC: 15.4 G/DL — SIGNIFICANT CHANGE UP (ref 13–17)
IMM GRANULOCYTES NFR BLD AUTO: 0.7 % — SIGNIFICANT CHANGE UP (ref 0–1.5)
LYMPHOCYTES # BLD AUTO: 1.24 K/UL — SIGNIFICANT CHANGE UP (ref 1–3.3)
LYMPHOCYTES # BLD AUTO: 13 % — SIGNIFICANT CHANGE UP (ref 13–44)
MCHC RBC-ENTMCNC: 32 GM/DL — SIGNIFICANT CHANGE UP (ref 32–36)
MCHC RBC-ENTMCNC: 32.7 PG — SIGNIFICANT CHANGE UP (ref 27–34)
MCV RBC AUTO: 102.3 FL — HIGH (ref 80–100)
MONOCYTES # BLD AUTO: 0.9 K/UL — SIGNIFICANT CHANGE UP (ref 0–0.9)
MONOCYTES NFR BLD AUTO: 9.4 % — SIGNIFICANT CHANGE UP (ref 2–14)
NEUTROPHILS # BLD AUTO: 7.22 K/UL — SIGNIFICANT CHANGE UP (ref 1.8–7.4)
NEUTROPHILS NFR BLD AUTO: 75.6 % — SIGNIFICANT CHANGE UP (ref 43–77)
PLATELET # BLD AUTO: 136 K/UL — LOW (ref 150–400)
POTASSIUM SERPL-MCNC: 4.4 MMOL/L — SIGNIFICANT CHANGE UP (ref 3.5–5.3)
POTASSIUM SERPL-SCNC: 4.4 MMOL/L — SIGNIFICANT CHANGE UP (ref 3.5–5.3)
RBC # BLD: 4.71 M/UL — SIGNIFICANT CHANGE UP (ref 4.2–5.8)
RBC # FLD: 13.7 % — SIGNIFICANT CHANGE UP (ref 10.3–14.5)
SODIUM SERPL-SCNC: 140 MMOL/L — SIGNIFICANT CHANGE UP (ref 135–145)
WBC # BLD: 9.56 K/UL — SIGNIFICANT CHANGE UP (ref 3.8–10.5)
WBC # FLD AUTO: 9.56 K/UL — SIGNIFICANT CHANGE UP (ref 3.8–10.5)

## 2020-09-21 PROCEDURE — 85025 COMPLETE CBC W/AUTO DIFF WBC: CPT

## 2020-09-21 PROCEDURE — 93010 ELECTROCARDIOGRAM REPORT: CPT

## 2020-09-21 PROCEDURE — 36415 COLL VENOUS BLD VENIPUNCTURE: CPT

## 2020-09-21 PROCEDURE — 80048 BASIC METABOLIC PNL TOTAL CA: CPT

## 2020-09-21 PROCEDURE — 93005 ELECTROCARDIOGRAM TRACING: CPT

## 2020-09-22 DIAGNOSIS — Z01.818 ENCOUNTER FOR OTHER PREPROCEDURAL EXAMINATION: ICD-10-CM

## 2020-09-22 DIAGNOSIS — Z86.010 PERSONAL HISTORY OF COLONIC POLYPS: ICD-10-CM

## 2020-09-26 ENCOUNTER — APPOINTMENT (OUTPATIENT)
Dept: DISASTER EMERGENCY | Facility: CLINIC | Age: 65
End: 2020-09-26

## 2020-09-26 DIAGNOSIS — Z01.818 ENCOUNTER FOR OTHER PREPROCEDURAL EXAMINATION: ICD-10-CM

## 2020-09-27 LAB — SARS-COV-2 N GENE NPH QL NAA+PROBE: NOT DETECTED

## 2020-09-29 ENCOUNTER — OUTPATIENT (OUTPATIENT)
Dept: OUTPATIENT SERVICES | Facility: HOSPITAL | Age: 65
LOS: 1 days | Discharge: HOME | End: 2020-09-29
Payer: MEDICARE

## 2020-09-29 ENCOUNTER — RESULT REVIEW (OUTPATIENT)
Age: 65
End: 2020-09-29

## 2020-09-29 VITALS
HEIGHT: 69 IN | TEMPERATURE: 97 F | HEART RATE: 77 BPM | RESPIRATION RATE: 19 BRPM | OXYGEN SATURATION: 98 % | WEIGHT: 179.02 LBS | SYSTOLIC BLOOD PRESSURE: 129 MMHG | DIASTOLIC BLOOD PRESSURE: 81 MMHG

## 2020-09-29 DIAGNOSIS — Z98.89 OTHER SPECIFIED POSTPROCEDURAL STATES: Chronic | ICD-10-CM

## 2020-09-29 DIAGNOSIS — Z86.010 PERSONAL HISTORY OF COLONIC POLYPS: ICD-10-CM

## 2020-09-29 PROCEDURE — 88305 TISSUE EXAM BY PATHOLOGIST: CPT

## 2020-09-29 PROCEDURE — 88305 TISSUE EXAM BY PATHOLOGIST: CPT | Mod: 26

## 2020-09-29 RX ORDER — MOMETASONE FUROATE 220 UG/1
2 INHALANT RESPIRATORY (INHALATION)
Qty: 0 | Refills: 0 | DISCHARGE

## 2020-10-04 DIAGNOSIS — K64.8 OTHER HEMORRHOIDS: ICD-10-CM

## 2020-10-04 DIAGNOSIS — Z12.11 ENCOUNTER FOR SCREENING FOR MALIGNANT NEOPLASM OF COLON: ICD-10-CM

## 2020-10-04 DIAGNOSIS — G47.33 OBSTRUCTIVE SLEEP APNEA (ADULT) (PEDIATRIC): ICD-10-CM

## 2020-10-04 DIAGNOSIS — J45.909 UNSPECIFIED ASTHMA, UNCOMPLICATED: ICD-10-CM

## 2020-10-04 DIAGNOSIS — I25.10 ATHEROSCLEROTIC HEART DISEASE OF NATIVE CORONARY ARTERY WITHOUT ANGINA PECTORIS: ICD-10-CM

## 2020-10-04 DIAGNOSIS — E78.5 HYPERLIPIDEMIA, UNSPECIFIED: ICD-10-CM

## 2020-10-04 DIAGNOSIS — K57.30 DIVERTICULOSIS OF LARGE INTESTINE WITHOUT PERFORATION OR ABSCESS WITHOUT BLEEDING: ICD-10-CM

## 2020-10-04 DIAGNOSIS — I25.2 OLD MYOCARDIAL INFARCTION: ICD-10-CM

## 2020-10-04 DIAGNOSIS — Z99.89 DEPENDENCE ON OTHER ENABLING MACHINES AND DEVICES: ICD-10-CM

## 2020-10-04 DIAGNOSIS — Z86.010 PERSONAL HISTORY OF COLONIC POLYPS: ICD-10-CM

## 2020-10-04 DIAGNOSIS — D12.3 BENIGN NEOPLASM OF TRANSVERSE COLON: ICD-10-CM

## 2020-10-06 ENCOUNTER — APPOINTMENT (OUTPATIENT)
Dept: PEDIATRIC ALLERGY IMMUNOLOGY | Facility: CLINIC | Age: 65
End: 2020-10-06
Payer: MEDICARE

## 2020-10-06 DIAGNOSIS — J45.909 UNSPECIFIED ASTHMA, UNCOMPLICATED: ICD-10-CM

## 2020-10-06 DIAGNOSIS — Z23 ENCOUNTER FOR IMMUNIZATION: ICD-10-CM

## 2020-10-06 DIAGNOSIS — J30.9 ALLERGIC RHINITIS, UNSPECIFIED: ICD-10-CM

## 2020-10-06 PROCEDURE — 99213 OFFICE O/P EST LOW 20 MIN: CPT | Mod: 25

## 2020-10-06 PROCEDURE — G0008: CPT

## 2020-10-06 PROCEDURE — 90662 IIV NO PRSV INCREASED AG IM: CPT

## 2020-10-06 NOTE — DATA REVIEWED
[FreeTextEntry1] : Reviewed all PEFT data - PB is 650 with variability of less than 20% with no specific pattern realted to any complaitns.

## 2020-10-06 NOTE — HISTORY OF PRESENT ILLNESS
[de-identified] : 65 yr old with moderate persistent asthma, sleep disorders and sleep apnea seen several weeks ago for complaints of intermittent complaints of difficulty breathing and, fatigue without wheezing, coughing, shortness of breath.  Pt was intermittently compliant with Arunity but over the past few weeks, better compliance has been noted with improvement in overall symptoms. Pt was also given a Peak flow meter and has recorded his PEFR AM and PM with PB of 650 and variability of less than 20% consistent with normal lung function.  Complaints are unlikely to be related to asthma.  He has since been able play tennis on several occasions for 2 hrs without difficulty

## 2020-10-06 NOTE — ASSESSMENT
[FreeTextEntry1] : 65 yr old with moderate persistent asthma doing much better with greater compliance with Arnuity 200 1p bid and Singulair 10 mg qd\par Suggest continuing all meds\par OK to get Influenza vaccine today.\par \par Follow up 4 months.\par \par Hao Smith MD, FAAP, FAAAAI\par Pediatric and Adult Allergy, Asthma, & Immunology\par [facility]\par [facility]\par [facility] Immunology at [place]\par [address] [facility] [place]  [address]\par [address] [place] [address], [address]  [address]\par [phone]\par

## 2020-10-06 NOTE — PHYSICAL EXAM
[Alert] : alert [No Acute Distress] : no acute distress [Normal Pupil & Iris Size/Symmetry] : normal pupil and iris size and symmetry [No Discharge] : no discharge [Normal TMs] : both tympanic membranes were normal [Normal Nasal Mucosa] : the nasal mucosa was normal [No Thrush] : no thrush [Boggy Nasal Turbinates] : no boggy and/or pale nasal turbinates [Posterior Pharyngeal Cobblestoning] : no posterior pharyngeal cobblestoning [Normal Rate and Effort] : normal respiratory rhythm and effort [No Crackles] : no crackles [Wheezing] : no wheezing was heard [Normal Rate] : heart rate was normal  [Normal S1, S2] : normal S1 and S2 [Regular Rhythm] : with a regular rhythm [Normal Cervical Lymph Nodes] : cervical [Skin Intact] : skin intact

## 2020-10-20 ENCOUNTER — APPOINTMENT (OUTPATIENT)
Dept: ORTHOPEDIC SURGERY | Facility: CLINIC | Age: 65
End: 2020-10-20
Payer: MEDICARE

## 2020-10-20 VITALS — TEMPERATURE: 97.5 F

## 2020-10-20 VITALS
SYSTOLIC BLOOD PRESSURE: 122 MMHG | WEIGHT: 180 LBS | HEIGHT: 69 IN | BODY MASS INDEX: 26.66 KG/M2 | HEART RATE: 81 BPM | DIASTOLIC BLOOD PRESSURE: 91 MMHG

## 2020-10-20 PROCEDURE — 99214 OFFICE O/P EST MOD 30 MIN: CPT | Mod: 25

## 2020-10-20 PROCEDURE — 20610 DRAIN/INJ JOINT/BURSA W/O US: CPT | Mod: RT

## 2020-10-21 NOTE — DISCUSSION/SUMMARY
[de-identified] : 66 y/o male with right knee pain secondary to medial meniscus tear.  A lengthy discussion was held regarding the patients condition and treatment options including all risks, benefits, prognosis and outcomes of each were discussed in detail. The patient would like to continue with conservative management at this time. Non-operative treatment was advised and a knee treatment handout was given and reviewed with the patient. A cortisone injection was administered in the office today. A prescription for physical therapy was provided. The patient will contact me if there are any concerns or if he desires to pursue surgical intervention in the future. Follow up will be prn. The patient expressed understanding and all questions were answered.\par \par

## 2020-10-21 NOTE — HISTORY OF PRESENT ILLNESS
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 He denies any injury or trauma. He states his symptoms are getting worse. He states that his knee is stiff especially after sitting for long periods. He states the pain is a 4/10 at this visit, which he takes Aleve or Tylenol. He denies any numbness or tingling. The pain does NOT keep him up at night.

## 2020-10-21 NOTE — PHYSICAL EXAM
[de-identified] : 66 y/o pleasant  male in NAD and AAOx3. 26 BMI. The pt has a mildly antalgic gait. Physical examination of both knees reveals normal contours, skin intact with no signs of infection, no erythema, no swelling, no effusion, no distal lymphedema or phlebitis, no patholaxity. ROM of the right knee reveals 0°-130° Strength is 5/5 within this arc of motion. There is pain on palpation to the medial joint line. Patient noted to have genu varum. There are no neurological deficits.\par  [de-identified] : X-rays were brought in by the patient from [facility] and reviewed by me today: 4 views of the right knee demonstrate mild osteoarthritis, with no acute fracture or dislocation.\par \par MR R Knee ([facility], 3/30/20): complex medial meniscus tear, mild degenerative changes, minimal effusion.

## 2020-10-21 NOTE — PROCEDURE
[de-identified] : After careful discussion with the patient regarding the risks versus benefits of a corticosteroid and local anesthetic injection, the patient has decided to proceed ahead with the treatment. After sterile preparation of the site, an injection has been given into the right knee using 8 cc of half percent Marcaine without epinephrine and 2 cc of betamethasone. The site was cleaned and a band-aid was applied. The patient tolerated the injection without complication but felt lightheaded. Pt was monitored by staff until he felt improvement of lightheadedness and felt able to sit up.\par

## 2020-10-21 NOTE — CONSULT LETTER
[Dear  ___] : Dear  [unfilled], [FreeTextEntry1] : I had the pleasure of evaluating your patient in the office today for complaints of right knee pain secondary to medial meniscus tear. I have enclosed a copy of today's office notes for your charts and for your review.\par \par Sincerely, \par \par Scotty Hartman M.D.\par Professor and \par Department of Orthopedic Surgery\par [facility] [place]\par

## 2021-02-02 ENCOUNTER — APPOINTMENT (OUTPATIENT)
Dept: PEDIATRIC ALLERGY IMMUNOLOGY | Facility: CLINIC | Age: 66
End: 2021-02-02

## 2021-03-11 ENCOUNTER — APPOINTMENT (OUTPATIENT)
Dept: ORTHOPEDIC SURGERY | Facility: CLINIC | Age: 66
End: 2021-03-11
Payer: MEDICARE

## 2021-03-11 PROCEDURE — 99213 OFFICE O/P EST LOW 20 MIN: CPT

## 2021-03-11 NOTE — CONSULT LETTER
[Dear  ___] : Dear  [unfilled], [FreeTextEntry1] : I had the pleasure of evaluating your patient in the office today for complaints of right knee pain secondary to medial meniscus tear. The patient would like to go forward with surgical intervention of his right medial meniscal tear. He will need COVID testing and medical and cardiac clearance for the procedure. I have enclosed a copy of today's office notes for your charts and for your review.\par \[facility] Sincerely, \par \par Scotty Hartman M.D.\par Professor and \[facility] Department of Orthopedic Surgery\[facility] [place]\[facility]

## 2021-03-11 NOTE — DISCUSSION/SUMMARY
[de-identified] : 64 y/o male with right knee pain secondary to medial meniscus tear.  A lengthy discussion was held regarding the patients condition and treatment options including all risks, benefits, prognosis and outcomes of each were discussed in detail. The patient would like to go forward with arthroscopic debridement vs repair of the medial meniscus tear. We discussed that the patient will need PT after surgery, 2-3 times a week for 6-8 weeks. A prescription will be provided on the day of surgery. We also discussed postoperative outcomes and the timeline of recovery and return to activity. The patient will contact me if there are any concerns. The patient will follow up after the surgery. The patient expressed understanding and all questions were answered.\par \par \par

## 2021-03-11 NOTE — PHYSICAL EXAM
[de-identified] : 66 y/o pleasant  male in NAD and AAOx3. 26 BMI. The pt has a mildly antalgic gait. Physical examination of both knees reveals normal contours, skin intact with no signs of infection, no erythema, no swelling, no effusion, no distal lymphedema or phlebitis, no patholaxity. ROM of the right knee reveals 0°-130° Strength is 5/5 within this arc of motion. There is pain on palpation to the medial joint line. Patient noted to have genu varum. There are no neurological deficits.\par  [de-identified] : X-rays were ordered, obtained, and interpreted by me today: 4 views of the right knee (3/11/21, [facility]) demonstrate mild Osteoarthritis, with no acute fracture or dislocation.\par \par MR R Knee ([facility], 3/30/20) reviewed today: complex medial meniscus tear, mild degenerative changes, minimal effusion.

## 2021-03-11 NOTE — PROCEDURE
[de-identified] : After careful discussion with the patient regarding the risks versus benefits of a corticosteroid and local anesthetic injection, the patient has decided to proceed ahead with the treatment. After sterile preparation of the site, an injection has been given into the right knee using 8 cc of half percent Marcaine without epinephrine and 2 cc of betamethasone. The site was cleaned and a band-aid was applied. The patient tolerated the injection without complication but felt lightheaded. Pt was monitored by staff until he felt improvement of lightheadedness and felt able to sit up.\par

## 2021-03-11 NOTE — HISTORY OF PRESENT ILLNESS
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 He was treated with a cortisone injectin which did help for 2 months but his symptoms have slowly returned. He states the pain is a 1/10 at this visit. Patient is interested in surgery.

## 2021-03-18 ENCOUNTER — OUTPATIENT (OUTPATIENT)
Dept: OUTPATIENT SERVICES | Facility: HOSPITAL | Age: 66
LOS: 1 days | End: 2021-03-18
Payer: MEDICARE

## 2021-03-18 VITALS
TEMPERATURE: 98 F | RESPIRATION RATE: 16 BRPM | DIASTOLIC BLOOD PRESSURE: 71 MMHG | SYSTOLIC BLOOD PRESSURE: 110 MMHG | OXYGEN SATURATION: 99 % | WEIGHT: 179.9 LBS | HEIGHT: 69 IN | HEART RATE: 69 BPM

## 2021-03-18 DIAGNOSIS — Z98.89 OTHER SPECIFIED POSTPROCEDURAL STATES: Chronic | ICD-10-CM

## 2021-03-18 DIAGNOSIS — G47.33 OBSTRUCTIVE SLEEP APNEA (ADULT) (PEDIATRIC): ICD-10-CM

## 2021-03-18 DIAGNOSIS — Z95.5 PRESENCE OF CORONARY ANGIOPLASTY IMPLANT AND GRAFT: Chronic | ICD-10-CM

## 2021-03-18 DIAGNOSIS — Z95.5 PRESENCE OF CORONARY ANGIOPLASTY IMPLANT AND GRAFT: ICD-10-CM

## 2021-03-18 DIAGNOSIS — S83.206A UNSPECIFIED TEAR OF UNSPECIFIED MENISCUS, CURRENT INJURY, RIGHT KNEE, INITIAL ENCOUNTER: ICD-10-CM

## 2021-03-18 DIAGNOSIS — S83.241S OTHER TEAR OF MEDIAL MENISCUS, CURRENT INJURY, RIGHT KNEE, SEQUELA: ICD-10-CM

## 2021-03-18 LAB
ANION GAP SERPL CALC-SCNC: 9 MMOL/L — SIGNIFICANT CHANGE UP (ref 5–17)
BUN SERPL-MCNC: 21 MG/DL — SIGNIFICANT CHANGE UP (ref 7–23)
CALCIUM SERPL-MCNC: 9.4 MG/DL — SIGNIFICANT CHANGE UP (ref 8.4–10.5)
CHLORIDE SERPL-SCNC: 105 MMOL/L — SIGNIFICANT CHANGE UP (ref 96–108)
CO2 SERPL-SCNC: 26 MMOL/L — SIGNIFICANT CHANGE UP (ref 22–31)
CREAT SERPL-MCNC: 1.06 MG/DL — SIGNIFICANT CHANGE UP (ref 0.5–1.3)
GLUCOSE SERPL-MCNC: 92 MG/DL — SIGNIFICANT CHANGE UP (ref 70–99)
POTASSIUM SERPL-MCNC: 4.3 MMOL/L — SIGNIFICANT CHANGE UP (ref 3.5–5.3)
POTASSIUM SERPL-SCNC: 4.3 MMOL/L — SIGNIFICANT CHANGE UP (ref 3.5–5.3)
SODIUM SERPL-SCNC: 140 MMOL/L — SIGNIFICANT CHANGE UP (ref 135–145)

## 2021-03-18 PROCEDURE — G0463: CPT

## 2021-03-18 PROCEDURE — 80048 BASIC METABOLIC PNL TOTAL CA: CPT

## 2021-03-18 RX ORDER — FLUTICASONE PROPIONATE 220 MCG
0 AEROSOL WITH ADAPTER (GRAM) INHALATION
Qty: 0 | Refills: 0 | DISCHARGE

## 2021-03-18 RX ORDER — FLUTICASONE PROPIONATE 50 MCG
2 SPRAY, SUSPENSION NASAL
Qty: 0 | Refills: 0 | DISCHARGE

## 2021-03-18 RX ORDER — METOPROLOL TARTRATE 50 MG
0 TABLET ORAL
Qty: 0 | Refills: 0 | DISCHARGE

## 2021-03-18 RX ORDER — FLUTICASONE PROPIONATE 220 MCG
1 AEROSOL WITH ADAPTER (GRAM) INHALATION
Qty: 0 | Refills: 0 | DISCHARGE

## 2021-03-18 RX ORDER — FLUTICASONE PROPIONATE 50 MCG
0 SPRAY, SUSPENSION NASAL
Qty: 0 | Refills: 0 | DISCHARGE

## 2021-03-18 RX ORDER — MILK THISTLE 150 MG
0 CAPSULE ORAL
Qty: 0 | Refills: 0 | DISCHARGE

## 2021-03-18 RX ORDER — MONTELUKAST 4 MG/1
1 TABLET, CHEWABLE ORAL
Qty: 0 | Refills: 0 | DISCHARGE

## 2021-03-18 RX ORDER — ARMODAFINIL 200 MG/1
0.5 TABLET ORAL
Qty: 0 | Refills: 0 | DISCHARGE

## 2021-03-18 RX ORDER — METOPROLOL TARTRATE 50 MG
0.5 TABLET ORAL
Qty: 0 | Refills: 0 | DISCHARGE

## 2021-03-18 NOTE — H&P PST ADULT - NSICDXPASTSURGICALHX_GEN_ALL_CORE_FT
PAST SURGICAL HISTORY:  S/P hernia repair inguinal    Stented coronary artery x1 2016, x2 2018     PAST SURGICAL HISTORY:  S/P hernia repair inguinal    Stented coronary artery GAURAV x1 2016, x2 2018

## 2021-03-18 NOTE — H&P PST ADULT - NEGATIVE CARDIOVASCULAR SYMPTOMS
no chest pain/no palpitations/no peripheral edema no chest pain/no palpitations/no dyspnea on exertion/no peripheral edema

## 2021-03-18 NOTE — H&P PST ADULT - NSICDXPASTMEDICALHX_GEN_ALL_CORE_FT
PAST MEDICAL HISTORY:  Asthma denies any recent exacerbation or intubation hx    CAD (coronary artery disease)     Elevated CPK > 35 years ago, been normal for many years as per pt    CAROLYN on CPAP     Right knee meniscal tear     ST elevation myocardial infarction (STEMI), unspecified artery 2016    Stented coronary artery 2016 and 2018     PAST MEDICAL HISTORY:  Asthma denies any recent exacerbation or intubation hx, denies any albuterol use    CAD (coronary artery disease)     Elevated CPK > 35 years ago, been normal for many years as per pt    History of chronic fatigue x many years    CAROLYN on CPAP     Right knee meniscal tear     ST elevation myocardial infarction (STEMI), unspecified artery 2016    Stented coronary artery 2016 and 2018

## 2021-03-18 NOTE — H&P PST ADULT - HISTORY OF PRESENT ILLNESS
64 y/o male with PMHx of Asthma - never intubated, HLD, CAROLYN on CPAP, Chronic Lethargy, CAD - STEMI in 2016 s/p CARO 2018 Caro x2, right meniscal tear planned for right operative 66 y/o male with PMHx of Asthma - never intubated, HLD, CAROLYN on CPAP, Chronic fatigue, CAD / STEMI 2016 s/p GAURAV 2018, GAURAV x2 (2018) with right knee pain/ right meniscal tear planned for right operative knee scope on 4/1/2021.     ****Covid test 3/29/2021 @ [facility]    ****denies any recent covid infection, exposure or recent travels

## 2021-03-18 NOTE — H&P PST ADULT - SKIN/BREAST
Tyree Medellin Patient Age: 66 year old  MESSAGE:   Pt states he has an apt with  on 1-7-19 and would like to know if Dr wants labs done prior to apt? Please advise. Message confirmed with caller.      Sending to Dr Simental's clinical pool         Next and Last Visit with Provider and Department  Last visit with this provider: Visit date not found  Last visit with this department: Visit date not found    Next visit with this provider: 1/7/2019  Next visit with this department: 1/7/2019    WEIGHT AND HEIGHT:   Wt Readings from Last 1 Encounters:   12/03/18 68 kg (150 lb)     Ht Readings from Last 1 Encounters:   12/03/18 5' 4\" (1.626 m)     BMI Readings from Last 1 Encounters:   12/03/18 25.75 kg/m²       ALLERGIES: not on file.  Current Outpatient Medications   Medication   • aspirin 81 MG EC tablet   • diclofenac (VOLTAREN) 1 % gel   • Multiple Vitamins-Minerals (CENTRUM SILVER 50+MEN) Tab   • Multiple Vitamin (MULTI-VITAMIN) tablet   • Glucosamine-Chondroitin 750-600 MG Tab   • meloxicam (MOBIC) 7.5 MG tablet     No current facility-administered medications for this visit.      PHARMACY to use: shown below        Pharmacy preference(s) on file:   [id number] - [place] [address] AT [place] [address] [place] [place] [address]  Phone: [phone] Fax: [phone]      CALL BACK INFO: Ok to leave response (including medical information) with family member or on answering machine  ROUTING: Patient's physician/staff        PCP: Mina Simental MD         INS: Payor: [employer]ICARE / Plan: [id number] / Product Type: HMO   PATIENT ADDRESS:  [address] [place] [id number]   negative

## 2021-03-18 NOTE — H&P PST ADULT - NSICDXPROBLEM_GEN_ALL_CORE_FT
PROBLEM DIAGNOSES  Problem: Right knee meniscal tear  Assessment and Plan: right operative knee scope  CBC 3/17 on HIE  BMP send  preprocedure surgical scrub instructions discussed   Hold Nuvagil am of sx ( patient stated he takes it to stay focus)   continue arnuity am of sx     Problem: CAROLYN on CPAP  Assessment and Plan: OR booking notified     Problem: Stented coronary artery  Assessment and Plan: continue aspirin  will obtain recent EKg/echo/ cardiac eval

## 2021-03-18 NOTE — H&P PST ADULT - ANESTHESIA, PREVIOUS REACTION, PROFILE
none age 28 patient rhinoplasty surgery was cancelled due to elevated CPK as per pt, CPH levels been normal x many years as per pt/none age 28 patient's rhinoplasty surgery was cancelled due to elevated CPK as per pt, CPk levels been normal x many years as per pt/none

## 2021-03-18 NOTE — H&P PST ADULT - OTHER CARE PROVIDERS
Dr. Conde cardio  seen last week, Dr. Dick Pulbernadette last seen 3/17/2021, Dr. Gerson diaz pul 2020

## 2021-03-25 PROBLEM — S83.206A UNSPECIFIED TEAR OF UNSPECIFIED MENISCUS, CURRENT INJURY, RIGHT KNEE, INITIAL ENCOUNTER: Chronic | Status: ACTIVE | Noted: 2021-03-18

## 2021-03-25 PROBLEM — Z95.5 PRESENCE OF CORONARY ANGIOPLASTY IMPLANT AND GRAFT: Chronic | Status: ACTIVE | Noted: 2018-06-15

## 2021-03-25 PROBLEM — R74.8 ABNORMAL LEVELS OF OTHER SERUM ENZYMES: Chronic | Status: ACTIVE | Noted: 2021-03-18

## 2021-03-25 PROBLEM — Z87.898 PERSONAL HISTORY OF OTHER SPECIFIED CONDITIONS: Chronic | Status: ACTIVE | Noted: 2021-03-18

## 2021-03-25 PROBLEM — I25.10 ATHEROSCLEROTIC HEART DISEASE OF NATIVE CORONARY ARTERY WITHOUT ANGINA PECTORIS: Chronic | Status: ACTIVE | Noted: 2021-03-18

## 2021-03-25 PROBLEM — I21.3 ST ELEVATION (STEMI) MYOCARDIAL INFARCTION OF UNSPECIFIED SITE: Chronic | Status: ACTIVE | Noted: 2018-06-15

## 2021-03-25 PROBLEM — G47.33 OBSTRUCTIVE SLEEP APNEA (ADULT) (PEDIATRIC): Chronic | Status: ACTIVE | Noted: 2021-03-18

## 2021-03-29 ENCOUNTER — OUTPATIENT (OUTPATIENT)
Dept: OUTPATIENT SERVICES | Facility: HOSPITAL | Age: 66
LOS: 1 days | End: 2021-03-29
Payer: MEDICARE

## 2021-03-29 DIAGNOSIS — Z95.5 PRESENCE OF CORONARY ANGIOPLASTY IMPLANT AND GRAFT: Chronic | ICD-10-CM

## 2021-03-29 DIAGNOSIS — Z98.89 OTHER SPECIFIED POSTPROCEDURAL STATES: Chronic | ICD-10-CM

## 2021-03-29 DIAGNOSIS — Z11.52 ENCOUNTER FOR SCREENING FOR COVID-19: ICD-10-CM

## 2021-03-29 LAB — SARS-COV-2 RNA SPEC QL NAA+PROBE: SIGNIFICANT CHANGE UP

## 2021-03-29 PROCEDURE — U0003: CPT

## 2021-03-29 PROCEDURE — C9803: CPT

## 2021-03-29 PROCEDURE — U0005: CPT

## 2021-03-31 ENCOUNTER — TRANSCRIPTION ENCOUNTER (OUTPATIENT)
Age: 66
End: 2021-03-31

## 2021-04-01 ENCOUNTER — APPOINTMENT (OUTPATIENT)
Dept: ORTHOPEDIC SURGERY | Facility: HOSPITAL | Age: 66
End: 2021-04-01

## 2021-04-01 ENCOUNTER — OUTPATIENT (OUTPATIENT)
Dept: OUTPATIENT SERVICES | Facility: HOSPITAL | Age: 66
LOS: 1 days | End: 2021-04-01
Payer: MEDICARE

## 2021-04-01 VITALS
DIASTOLIC BLOOD PRESSURE: 61 MMHG | RESPIRATION RATE: 18 BRPM | OXYGEN SATURATION: 96 % | TEMPERATURE: 97 F | SYSTOLIC BLOOD PRESSURE: 102 MMHG | HEART RATE: 76 BPM

## 2021-04-01 VITALS
DIASTOLIC BLOOD PRESSURE: 77 MMHG | RESPIRATION RATE: 16 BRPM | HEIGHT: 69 IN | SYSTOLIC BLOOD PRESSURE: 117 MMHG | WEIGHT: 179.9 LBS | HEART RATE: 68 BPM | OXYGEN SATURATION: 98 % | TEMPERATURE: 98 F

## 2021-04-01 DIAGNOSIS — S83.241S OTHER TEAR OF MEDIAL MENISCUS, CURRENT INJURY, RIGHT KNEE, SEQUELA: ICD-10-CM

## 2021-04-01 DIAGNOSIS — Z98.89 OTHER SPECIFIED POSTPROCEDURAL STATES: Chronic | ICD-10-CM

## 2021-04-01 DIAGNOSIS — Z95.5 PRESENCE OF CORONARY ANGIOPLASTY IMPLANT AND GRAFT: Chronic | ICD-10-CM

## 2021-04-01 PROCEDURE — 29881 ARTHRS KNE SRG MNISECTMY M/L: CPT | Mod: RT

## 2021-04-01 RX ORDER — SODIUM CHLORIDE 9 MG/ML
1000 INJECTION, SOLUTION INTRAVENOUS
Refills: 0 | Status: DISCONTINUED | OUTPATIENT
Start: 2021-04-01 | End: 2021-04-15

## 2021-04-01 RX ORDER — LIDOCAINE HCL 20 MG/ML
0.2 VIAL (ML) INJECTION ONCE
Refills: 0 | Status: DISCONTINUED | OUTPATIENT
Start: 2021-04-01 | End: 2021-04-15

## 2021-04-01 RX ORDER — SODIUM CHLORIDE 9 MG/ML
3 INJECTION INTRAMUSCULAR; INTRAVENOUS; SUBCUTANEOUS EVERY 8 HOURS
Refills: 0 | Status: DISCONTINUED | OUTPATIENT
Start: 2021-04-01 | End: 2021-04-15

## 2021-04-01 RX ORDER — OXYCODONE HYDROCHLORIDE 5 MG/1
5 TABLET ORAL ONCE
Refills: 0 | Status: DISCONTINUED | OUTPATIENT
Start: 2021-04-01 | End: 2021-04-01

## 2021-04-01 RX ORDER — ONDANSETRON 8 MG/1
4 TABLET, FILM COATED ORAL ONCE
Refills: 0 | Status: DISCONTINUED | OUTPATIENT
Start: 2021-04-01 | End: 2021-04-15

## 2021-04-01 RX ORDER — FENTANYL CITRATE 50 UG/ML
25 INJECTION INTRAVENOUS
Refills: 0 | Status: DISCONTINUED | OUTPATIENT
Start: 2021-04-01 | End: 2021-04-01

## 2021-04-01 NOTE — ASU PREOP CHECKLIST - HIBICLENS SHOWER 1 DATE
ED General





- General


Chief Complaint: Cold Symptoms


Stated Complaint: DIFFICULTY BREATHING


Time Seen by Provider: 01/02/18 14:44


Information source: Patient


Notes: 





Patient is a 22 year old male who presents with 1 month history of productive 

cough (yellow sputum), nasal congestion, fever (Tmax 101F yesterday), and sore 

throat. He denies any headache, neck pain/stiffness, SOB, chest pain, n/v/d. He 

endorses sick contact (nephew). He did not get flu vaccine. He has tried 

ibuprofen but no OTC cold medications with no symptomatic relief. He is non-

smoker. 


TRAVEL OUTSIDE OF THE [place] IN LAST 30 DAYS: No





- Related Data


Allergies/Adverse Reactions: 


 





No Known Allergies Allergy (Verified 01/02/18 14:12)


 











Past Medical History





- General


Information source: Patient





- Social History


Smoking Status: Never Smoker


Family History: Reviewed & Not Pertinent


Pulmonary Medical History: Reports: Hx Asthma


Renal/ Medical History: Denies: Hx Peritoneal Dialysis





- Immunizations


Hx Diphtheria, Pertussis, Tetanus Vaccination: Yes





Review of Systems





- Review of Systems


Constitutional: See HPI


EENT: See HPI


Cardiovascular: No symptoms reported


Respiratory: See HPI


Gastrointestinal: No symptoms reported


Genitourinary: No symptoms reported


Male Genitourinary: No symptoms reported


Musculoskeletal: No symptoms reported


Skin: No symptoms reported


Hematologic/Lymphatic: No symptoms reported


Neurological/Psychological: No symptoms reported





Physical Exam





- Vital signs


Vitals: 


 











Temp Pulse Resp BP Pulse Ox


 


 97.9 F   105 H  18   143/75 H  99 


 


 01/02/18 14:30  01/02/18 14:30  01/02/18 14:30  01/02/18 14:30  01/02/18 14:30














- Notes


Notes: 





PHYSICAL EXAM:


CONSTITUTIONAL: Alert and oriented, well-appearing and in no acute distress. 

Speaking in full sentences, no hypoxia, no tacypnea. 


HENT: Normocephalic, atraumatic. Ear canals without erythema or foreign body, 

TMs pearly grey with good bony landmarks. Nares clear without erythema, septal 

hematoma or deviation, airway patent. Oropharynx erythematous with bilateral 

tonsillar enlargement without exudate or malocclusion. Trachea midline. Uvula 

midline. Moist mucous membranes.


EYES: Pupils equal round and reactive to light, EOM intact. Sclera anicteric, 

conjunctiva are normal. No entrapment. 


NECK: supple without lymphadenopathy. No midline tenderness or paraspinous 

muscle spasms. No step-offs or deformities. ROM intact. 


HEART: Regular rate and rhythm without murmurs. 


LUNGS: CTAB and equal. No wheezes, rales or rhonchi. 


GI: Normactive bowel sounds. Nontender, non-distended. No organomegaly. no 

CVAT. 


EXTREMITIES: Normal range of motion, no pitting edema. No cyanosis. Cap Refill <

3 seconds.


NEURO: Cranial nerves grossly intact. Normal sensory/motor exams.


PSYCH: Normal mood, normal affect. 


SKIN: Warm and dry. Normal turgor. No rashes or lesions noted.





Course





- Re-evaluation


Re-evalutation: 





01/02/18 16:02


Patient seen and examined. Speaking in full sentences without difficulty, no 

respiratory distress. No hypoxia or tachypnea noted. Lungs CTAB. Will order 

chest xray and get rapid strep swab.





01/02/18 16:20


reviewed imaging studies - negative for acute findings. Discussed results with 

patient. 





reviewed labs-  rapid strep pos. will give IM bicillin and decadron. Discussed 

supportive care instructions. 





At this time, will discharge with return precautions and follow-up 

recommendations. Verbal discharge instructions given at the bedside and 

opportunity for questions given. Medication warnings reviewed. Patient is in 

agreement with this plan and has verbalized understanding of return precautions 

and the need for primary care follow-up in the next 24-72 hours. 











- Vital Signs


Vital signs: 


 











Temp Pulse Resp BP Pulse Ox


 


 97.9 F   105 H  18   143/75 H  99 


 


 01/02/18 14:30  01/02/18 14:30  01/02/18 14:30  01/02/18 14:30  01/02/18 14:30














- Diagnostic Test


Radiology reviewed: Image reviewed, Reports reviewed





Discharge





- Discharge


Clinical Impression: 


 Strep pharyngitis





Condition: Stable


Disposition: HOME, SELF-CARE


Additional Instructions: 


SORE THROAT:





     Sore throats may be caused by viruses, bacteria, or fungi.  Most are due 

to a virus, and must get better on their own.  Bacterial sore throats, 

particularly those due to "strep," need treatment with antibiotics.


     If an antibiotic is prescribed, be sure to take the medication for a full 

10 days.  Failure to take the antibiotic can result in complications such as 

rheumatic fever.  Sometimes, an injection of antibiotics is given instead of 

pills or liquid.  This single "shot" is equal in effectiveness to the oral 

medication.


     To relieve symptoms, take acetaminophen for pain.  Sip clear liquids 

frequently, or eat popsicles or ice chips.  Anesthetic sprays or lozenges may 

help.  Make sure the air in the room is not too dry. Avoid using decongestants 

or antihistamines.


     Call the doctor if there is no improvement in two days, or if you have 

difficulty breathing, increasing throat pain, high fever, rash, or frequent 

vomiting.








STREP THROAT:


     Your sore throat is due to the streptococcus germ (strep throat). Strep 

throat usually makes you feel quite ill with fever and aches, headache, swollen 

sore throat, and tender bumps under the angles of the jaw.  Strep throat 

requires antibiotic treatment.  Although the sore throat may go away by itself, 

complications such as rheumatic fever, kidney disease, or throat abscess can 

occur.


     We usually prescribe antibiotics by mouth.  Be sure to take the medicine 

until it's gone.  If you stop early, the strep may come back. If you are 

vomiting, are severely ill, or can't remember to take pills, we can give you an 

antibiotic shot.


     Take acetaminophen or ibuprofen for pain and fever.  Sip frequent clear 

liquids, or use popsicles or ice chips.  Anesthetic sprays or lozenges may 

help.  Make sure the air in the room is not too dry. Avoid using decongestants 

or antihistamines.


     Call the doctor if there is no improvement in three days, or if you have 

difficulty breathing, increasing throat pain, high fever, rash, or frequent 

vomiting.








ORAL NARCOTIC MEDICATION:


     You have been given a prescription for pain control.  This medication is a 

narcotic.  It's best taken with food, as nausea can result if taken on an empty 

stomach.


     Don't operate machinery or drive within six hours of taking this 

medication.  Do not combine this medicine with alcohol, or with any medication 

which can cause sedation (such as cold tablets or sleeping pills) unless you 

get permission from the physician.


     Narcotics tend to cause constipation.  If possible, drink plenty of fluids 

and eat a diet high in fiber and fruits.





     Please be aware that prescription narcotics also have the potential for 

abuse.  People become addicted to these medications because of the general 

sense of wellbeing that they induce.  This feeling along with a significant 

reduction in tension, anxiety, and aggression provides a stimulating seductive 

quality to these drugs.  Once your pain is under control, we encourage you to 

discard your unused narcotics.








PENICILLIN V K:


     You have been given a one-time dose of Penicillin here.  Your physician 

has determined that this is the best antibiotic for your condition.


     Pen VK can be taken with meals, however more of the antibiotic gets into 

the bloodstream if it's taken on an empty stomach.


     Penicillin usually has no side effects.  However, allergy to penicillins 

is common.  If you have had an allergic reaction to any drug of the penicillin 

family, you should never take any other penicillin.  Notify your doctor at once 

if you develop hives, itching, swelling, faintness, or shortness of breath.








STEROID MEDICATION:


     You have been given a one time dose of the cortisone/steroid class.  This 

medication is used to control inflammation or allergy.  It is usually only 

given for a short period of time, until the acute process subsides.


     There are usually no side effects from short-term use of cortisone-like 

medications.  Some persons feel an increased sense of well-being and are not 

sleepy at bedtime.  Long-term use of cortisone medications is best avoided, 

unless required for a severe condition.  If your condition does not remit, or 

relapses after the course of corticosteroid medication, you should consult your 

physician.








FOLLOW-UP CARE:


If you have been referred to a physician for follow-up care, call the physician

s office for an appointment as you were instructed or within the next two days.

  If you experience worsening or a significant change in your symptoms, notify 

the physician immediately or return to the Emergency Department at any time for 

re-evaluation.





Prescriptions: 


Hydrocodone/Acetaminophen [Vicodin 5-300 mg Tablet] 1 tab PO Q6HP PRN #12 tab


 PRN Reason: 


Forms:  Elevated Blood Pressure, Return to Work
30-Mar-2021 07:43

## 2021-04-01 NOTE — ASU DISCHARGE PLAN (ADULT/PEDIATRIC) - NURSING INSTRUCTIONS
******************************************************************************************  Next dose of TYLENOL may be taken at or after ______4:50_______ PM if needed. DO NOT take any additional products containing TYLENOL or ACETAMINOPHEN, such as VICODIN, PERCOCET, NORCO, EXCEDRIN, and any over-the-counter cold medications until this time. DO NOT CONSUME MORE THAN 0421-3807 MG of TYLENOL (acetaminophen) in a 24-hour period.   ******************************************************************************************

## 2021-04-01 NOTE — ASU DISCHARGE PLAN (ADULT/PEDIATRIC) - CARE PROVIDER_API CALL
Scotty Hartman)  Orthopaedic Surgery  [address], [address]  [place] [address]  Phone: [phone]  Fax: [phone]  Follow Up Time:

## 2021-04-01 NOTE — ASU PATIENT PROFILE, ADULT - PMH
Asthma  denies any recent exacerbation or intubation hx, denies any albuterol use  CAD (coronary artery disease)    Elevated CPK  > 35 years ago, been normal for many years as per pt  History of chronic fatigue  x many years  CAROLYN on CPAP    Right knee meniscal tear    ST elevation myocardial infarction (STEMI), unspecified artery  2016  Stented coronary artery  2016 and 2018

## 2021-04-01 NOTE — ASU PATIENT PROFILE, ADULT - ANESTHESIA, PREVIOUS REACTION, PROFILE
age 28 patient's rhinoplasty surgery was cancelled due to elevated CPK as per pt, CPk levels been normal x many years as per pt/none

## 2021-04-01 NOTE — ASU DISCHARGE PLAN (ADULT/PEDIATRIC) - ASU DC SPECIAL INSTRUCTIONSFT
Follow up with Dr Hartman in 10-14 days. Call office for appointment. Take medications as prescribed. Keep dressing clean, dry, and intact. Rest, ice, and elevate affected extremity.  Weight bearing as tolerated right lower extremity with cane  Physical therapy will begin tomorrow 4/2    See instruction provided by Dr. Hartman for further instructions

## 2021-04-20 ENCOUNTER — APPOINTMENT (OUTPATIENT)
Dept: ORTHOPEDIC SURGERY | Facility: CLINIC | Age: 66
End: 2021-04-20
Payer: MEDICARE

## 2021-04-20 DIAGNOSIS — S83.241S OTHER TEAR OF MEDIAL MENISCUS, CURRENT INJURY, RIGHT KNEE, SEQUELA: ICD-10-CM

## 2021-04-20 PROCEDURE — 99024 POSTOP FOLLOW-UP VISIT: CPT

## 2021-04-20 NOTE — CONSULT LETTER
[FreeTextEntry1] : I had the pleasure of evaluating your patient in the office today for routine follow up of right arthroscopic knee partial medial menisectomy. I have enclosed a copy of today's office notes for your charts and for your review.\par \par Sincerely, \par \par Scotty Hartman M.D.\par Professor and \par Department of Orthopedic Surgery\par [facility] [place]\par

## 2021-04-20 NOTE — PHYSICAL EXAM
[de-identified] : 67 y/o pleasant  male in NAD and AAOx3. normal BMI. The pt has a mildly antalgic gait. Physical examination of the right knee reveals normal contours, Right portal incisional scars healing well with no signs of infection, no erythema, no swelling, no effusion, no distal lymphedema or phlebitis, no patholaxity. ROM of the right knee reveals 0°-120° Strength is 5/5 within this arc of motion. There are no neurological deficits.\par  [de-identified] : No additional imaging at this visit.\par

## 2021-04-20 NOTE — DISCUSSION/SUMMARY
[de-identified] : 67 y/o s/p right knee arthroscopy w/ PMM (4/1/21). Intraoperative images were provided to the patient and reviewed. The patient will continue to do physical therapy w/ the physical therapists and at home. Stationary biking, elliptical was recommended, and low impact exercises encouraged, with minimal resistance. Continue NSAIDS as needed and Ice for pain or discomfort. The patient will contact me if there are any concerns otherwise follow up will be on a prn basis. The patient express understanding and all questions were answered.\par

## 2021-04-20 NOTE — HISTORY OF PRESENT ILLNESS
[de-identified] : 67 y/o male s/p right knee arthroscopic PMM (4/1/21) is overall doing well. He is going to PT 3 x week at hospitals. He has minimal pain and is overall doing well. He has no complaints.

## 2021-08-04 ENCOUNTER — APPOINTMENT (OUTPATIENT)
Dept: MRI IMAGING | Facility: HOSPITAL | Age: 66
End: 2021-08-04

## 2021-11-11 ENCOUNTER — APPOINTMENT (OUTPATIENT)
Dept: ORTHOPEDIC SURGERY | Facility: CLINIC | Age: 66
End: 2021-11-11
Payer: MEDICARE

## 2021-11-11 DIAGNOSIS — M25.551 PAIN IN RIGHT HIP: ICD-10-CM

## 2021-11-11 PROCEDURE — 73502 X-RAY EXAM HIP UNI 2-3 VIEWS: CPT | Mod: RT

## 2021-11-11 PROCEDURE — 99214 OFFICE O/P EST MOD 30 MIN: CPT

## 2021-11-14 NOTE — DISCUSSION/SUMMARY
[de-identified] : This patient has extra-articular right hip pain and his MRI is consistent with right lower extremity radiculopathy secondary to herniated disc at L5-S1 with contact of the descending nerve root.  The patient is not an appropriate candidate for surgical intervention at this time. An extensive discussion was conducted on the natural history of the disease and the variety of surgical and non-surgical options available to the patient including, but not limited to non-steroidal anti-inflammatory medications, steroid injections, physical therapy, maintenance of ideal body weight, and reduction of activity. \par The patient will schedule an appointment as needed.  Referred to physiatry or spine surgery for consideration of epidural steroid injections or selective nerve root injections.  He can take NSAIDs for pain.\par

## 2021-11-14 NOTE — PHYSICAL EXAM
[de-identified] : Patient is well nourished, well-developed, in no acute distress, with appropriate mood and affect. The patient is oriented to time, place, and person. Respirations are even and unlabored. Gait evaluation does not reveal a limp. There is no inguinal adenopathy. Examination of the contralateral hip shows normal range of motion, strength, no tenderness, and intact skin. The affected limb is well-perfused and showed 2+ dp/pt pulses, without skin lesions, shows a grossly normal motor and sensory examination. Examination of the hip shows no skin lesions. Hip motion is full and painless from 0-90 degrees extension to flexion, 20 degrees adduction and 20 degrees abduction, and 15 degrees internal and 30 degrees external rotation. Leg lengths are approximately equal. FADIR is negative and CHAD is negative. Stinchfield test is negative. Both hips are stable and muscle strength is normal with good strength with resisted abduction and adduction. Pedal pulses are palpable.  Examination of the lumbar spine reveals full range of motion without pain. There is no tenderness to palpation of the osseous structures or paravertebral soft tissues. There is no muscle spasm. Straight leg raise is negative bilaterally. [de-identified] : AP and lateral x-rays of the right hip, pelvis, and femur were ordered and taken in the office and demonstrate no evidence of degenerative joint disease of the hip with maintained joint space and no evidence of fractures or other intraarticular pathology.\par \par The patient brings with him an MRI of the lumbar spine.  I reviewed the MR imaging with the patient was demonstrates L5-S1 herniated disks involving the right S1 nerve root.

## 2021-11-14 NOTE — HISTORY OF PRESENT ILLNESS
[de-identified] : This is very nice 66-year-old gentleman experiencing right hip pain, which is severe in intensity.  Pain is been present for approximately 1 month.  It has been fluctuating.  Rest helps.  Has a history of spinal stenosis and epidurals have helped.  No numbness or tingling or weakness in the leg.  Not using cane or walker.  The patient denies any radiation of the pain to the feet and it is not associated with numbness, tingling, or weakness.

## 2021-11-22 ENCOUNTER — OUTPATIENT (OUTPATIENT)
Dept: OUTPATIENT SERVICES | Facility: HOSPITAL | Age: 66
LOS: 1 days | End: 2021-11-22
Payer: MEDICARE

## 2021-11-22 ENCOUNTER — APPOINTMENT (OUTPATIENT)
Dept: CT IMAGING | Facility: CLINIC | Age: 66
End: 2021-11-22
Payer: MEDICARE

## 2021-11-22 DIAGNOSIS — Z95.5 PRESENCE OF CORONARY ANGIOPLASTY IMPLANT AND GRAFT: Chronic | ICD-10-CM

## 2021-11-22 DIAGNOSIS — Z98.89 OTHER SPECIFIED POSTPROCEDURAL STATES: Chronic | ICD-10-CM

## 2021-11-22 DIAGNOSIS — Z00.8 ENCOUNTER FOR OTHER GENERAL EXAMINATION: ICD-10-CM

## 2021-11-22 PROCEDURE — 70486 CT MAXILLOFACIAL W/O DYE: CPT | Mod: 26,MH

## 2021-11-22 PROCEDURE — 70486 CT MAXILLOFACIAL W/O DYE: CPT | Mod: MH

## 2021-11-23 ENCOUNTER — APPOINTMENT (OUTPATIENT)
Dept: ORTHOPEDIC SURGERY | Facility: CLINIC | Age: 66
End: 2021-11-23

## 2022-05-19 ENCOUNTER — NON-APPOINTMENT (OUTPATIENT)
Age: 67
End: 2022-05-19

## 2022-06-21 NOTE — ASU PATIENT PROFILE, ADULT - TEACHING/LEARNING FACTORS INFLUENCE READINESS TO LEARN
none Solaraze Pregnancy And Lactation Text: This medication is Pregnancy Category B and is considered safe. There is some data to suggest avoiding during the third trimester. It is unknown if this medication is excreted in breast milk.

## 2022-12-08 NOTE — ASU PREOP CHECKLIST - ADVANCE DIRECTIVE ADDRESSED/READDRESSED
31 year old male with no PMHx, cough, fever, chills with intermittent chest pain x3 days. CXR, EKG, IBU, RVP, re-assess done

## 2024-03-11 ENCOUNTER — EMERGENCY (EMERGENCY)
Facility: HOSPITAL | Age: 69
LOS: 1 days | Discharge: HOME | End: 2024-03-11
Attending: EMERGENCY MEDICINE | Admitting: EMERGENCY MEDICINE
Payer: MEDICARE

## 2024-03-11 VITALS
SYSTOLIC BLOOD PRESSURE: 123 MMHG | TEMPERATURE: 98 F | RESPIRATION RATE: 18 BRPM | HEART RATE: 68 BPM | OXYGEN SATURATION: 95 % | DIASTOLIC BLOOD PRESSURE: 83 MMHG

## 2024-03-11 VITALS
TEMPERATURE: 97 F | RESPIRATION RATE: 16 BRPM | SYSTOLIC BLOOD PRESSURE: 133 MMHG | HEART RATE: 84 BPM | DIASTOLIC BLOOD PRESSURE: 94 MMHG | OXYGEN SATURATION: 98 %

## 2024-03-11 DIAGNOSIS — Z95.5 PRESENCE OF CORONARY ANGIOPLASTY IMPLANT AND GRAFT: Chronic | ICD-10-CM

## 2024-03-11 DIAGNOSIS — Z98.89 OTHER SPECIFIED POSTPROCEDURAL STATES: Chronic | ICD-10-CM

## 2024-03-11 LAB
ALBUMIN SERPL ELPH-MCNC: 3.8 G/DL — SIGNIFICANT CHANGE UP (ref 3.3–5)
ALP SERPL-CCNC: 95 U/L — SIGNIFICANT CHANGE UP (ref 40–120)
ALT FLD-CCNC: 42 U/L — SIGNIFICANT CHANGE UP (ref 12–78)
ANION GAP SERPL CALC-SCNC: 7 MMOL/L — SIGNIFICANT CHANGE UP (ref 5–17)
AST SERPL-CCNC: 44 U/L — HIGH (ref 15–37)
BASOPHILS # BLD AUTO: 0.05 K/UL — SIGNIFICANT CHANGE UP (ref 0–0.2)
BASOPHILS NFR BLD AUTO: 0.5 % — SIGNIFICANT CHANGE UP (ref 0–2)
BILIRUB SERPL-MCNC: 0.5 MG/DL — SIGNIFICANT CHANGE UP (ref 0.2–1.2)
BUN SERPL-MCNC: 23 MG/DL — SIGNIFICANT CHANGE UP (ref 7–23)
CALCIUM SERPL-MCNC: 8.6 MG/DL — SIGNIFICANT CHANGE UP (ref 8.5–10.1)
CHLORIDE SERPL-SCNC: 106 MMOL/L — SIGNIFICANT CHANGE UP (ref 96–108)
CO2 SERPL-SCNC: 29 MMOL/L — SIGNIFICANT CHANGE UP (ref 22–31)
CREAT SERPL-MCNC: 1.1 MG/DL — SIGNIFICANT CHANGE UP (ref 0.5–1.3)
D DIMER BLD IA.RAPID-MCNC: 188 NG/ML DDU — SIGNIFICANT CHANGE UP
EGFR: 72 ML/MIN/1.73M2 — SIGNIFICANT CHANGE UP
EOSINOPHIL # BLD AUTO: 0.09 K/UL — SIGNIFICANT CHANGE UP (ref 0–0.5)
EOSINOPHIL NFR BLD AUTO: 0.9 % — SIGNIFICANT CHANGE UP (ref 0–6)
GLUCOSE SERPL-MCNC: 120 MG/DL — HIGH (ref 70–99)
HCT VFR BLD CALC: 41.9 % — SIGNIFICANT CHANGE UP (ref 39–50)
HGB BLD-MCNC: 14.4 G/DL — SIGNIFICANT CHANGE UP (ref 13–17)
IMM GRANULOCYTES NFR BLD AUTO: 0.4 % — SIGNIFICANT CHANGE UP (ref 0–0.9)
LIDOCAIN IGE QN: 37 U/L — SIGNIFICANT CHANGE UP (ref 13–75)
LYMPHOCYTES # BLD AUTO: 1.77 K/UL — SIGNIFICANT CHANGE UP (ref 1–3.3)
LYMPHOCYTES # BLD AUTO: 17.9 % — SIGNIFICANT CHANGE UP (ref 13–44)
MCHC RBC-ENTMCNC: 33.7 PG — SIGNIFICANT CHANGE UP (ref 27–34)
MCHC RBC-ENTMCNC: 34.4 GM/DL — SIGNIFICANT CHANGE UP (ref 32–36)
MCV RBC AUTO: 98.1 FL — SIGNIFICANT CHANGE UP (ref 80–100)
MONOCYTES # BLD AUTO: 0.95 K/UL — HIGH (ref 0–0.9)
MONOCYTES NFR BLD AUTO: 9.6 % — SIGNIFICANT CHANGE UP (ref 2–14)
NEUTROPHILS # BLD AUTO: 6.99 K/UL — SIGNIFICANT CHANGE UP (ref 1.8–7.4)
NEUTROPHILS NFR BLD AUTO: 70.7 % — SIGNIFICANT CHANGE UP (ref 43–77)
NRBC # BLD: 0 /100 WBCS — SIGNIFICANT CHANGE UP (ref 0–0)
PLATELET # BLD AUTO: 155 K/UL — SIGNIFICANT CHANGE UP (ref 150–400)
POTASSIUM SERPL-MCNC: 4.4 MMOL/L — SIGNIFICANT CHANGE UP (ref 3.5–5.3)
POTASSIUM SERPL-SCNC: 4.4 MMOL/L — SIGNIFICANT CHANGE UP (ref 3.5–5.3)
PROT SERPL-MCNC: 6.9 G/DL — SIGNIFICANT CHANGE UP (ref 6–8.3)
RBC # BLD: 4.27 M/UL — SIGNIFICANT CHANGE UP (ref 4.2–5.8)
RBC # FLD: 14.4 % — SIGNIFICANT CHANGE UP (ref 10.3–14.5)
SODIUM SERPL-SCNC: 142 MMOL/L — SIGNIFICANT CHANGE UP (ref 135–145)
TROPONIN I, HIGH SENSITIVITY RESULT: 10.2 NG/L — SIGNIFICANT CHANGE UP
TROPONIN I, HIGH SENSITIVITY RESULT: 10.9 NG/L — SIGNIFICANT CHANGE UP
WBC # BLD: 9.89 K/UL — SIGNIFICANT CHANGE UP (ref 3.8–10.5)
WBC # FLD AUTO: 9.89 K/UL — SIGNIFICANT CHANGE UP (ref 3.8–10.5)

## 2024-03-11 PROCEDURE — 93010 ELECTROCARDIOGRAM REPORT: CPT

## 2024-03-11 PROCEDURE — 71045 X-RAY EXAM CHEST 1 VIEW: CPT | Mod: 26

## 2024-03-11 PROCEDURE — 84484 ASSAY OF TROPONIN QUANT: CPT

## 2024-03-11 PROCEDURE — 93005 ELECTROCARDIOGRAM TRACING: CPT

## 2024-03-11 PROCEDURE — 36415 COLL VENOUS BLD VENIPUNCTURE: CPT

## 2024-03-11 PROCEDURE — 85379 FIBRIN DEGRADATION QUANT: CPT

## 2024-03-11 PROCEDURE — 71045 X-RAY EXAM CHEST 1 VIEW: CPT

## 2024-03-11 PROCEDURE — 85025 COMPLETE CBC W/AUTO DIFF WBC: CPT

## 2024-03-11 PROCEDURE — 80053 COMPREHEN METABOLIC PANEL: CPT

## 2024-03-11 PROCEDURE — 99285 EMERGENCY DEPT VISIT HI MDM: CPT

## 2024-03-11 PROCEDURE — 83690 ASSAY OF LIPASE: CPT

## 2024-03-11 NOTE — ED PROVIDER NOTE - PATIENT PORTAL LINK FT
You can access the FollowMyHealth Patient Portal offered by [facility] by registering at the following website: [email or web address]. By joining [facility]’s FollowMyHealth portal, you will also be able to view your health information using other applications (apps) compatible with our system.

## 2024-03-11 NOTE — ED PROVIDER NOTE - NSICDXPASTMEDICALHX_GEN_ALL_CORE_FT
PAST MEDICAL HISTORY:  Asthma denies any recent exacerbation or intubation hx, denies any albuterol use    CAD (coronary artery disease)     Elevated CPK > 35 years ago, been normal for many years as per pt    History of chronic fatigue x many years    CAROLYN on CPAP     Right knee meniscal tear     ST elevation myocardial infarction (STEMI), unspecified artery 2016    Stented coronary artery 2016 and 2018

## 2024-03-11 NOTE — ED PROVIDER NOTE - CARE PROVIDER_API CALL
Alexandra Bennett  Cardiology  [address] [place] [address]  Phone: [phone]  Fax: [phone]  Follow Up Time:

## 2024-03-11 NOTE — ED PROVIDER NOTE - NSICDXPASTSURGICALHX_GEN_ALL_CORE_FT
PAST SURGICAL HISTORY:  S/P hernia repair inguinal    Stented coronary artery GAURAV x1 2016, x2 2018

## 2024-03-11 NOTE — ED PROVIDER NOTE - TOBACCO USE
Problem: Risk for Impaired Skin Integrity  Goal: Tissue integrity - skin and mucous membranes  Description  Structural intactness and normal physiological function of skin and  mucous membranes.   Outcome: Ongoing     Problem: Falls - Risk of:  Goal: Will remain free from falls  Description  Will remain free from falls  Outcome: Ongoing  Goal: Absence of physical injury  Description  Absence of physical injury  Outcome: Ongoing     Problem: Confusion - Acute:  Goal: Absence of continued neurological deterioration signs and symptoms  Description  Absence of continued neurological deterioration signs and symptoms  Outcome: Ongoing  Goal: Mental status will be restored to baseline  Description  Mental status will be restored to baseline  Outcome: Ongoing     Problem: Discharge Planning:  Goal: Ability to perform activities of daily living will improve  Description  Ability to perform activities of daily living will improve  Outcome: Ongoing  Goal: Participates in care planning  Description  Participates in care planning  Outcome: Ongoing     Problem: Injury - Risk of, Physical Injury:  Goal: Will remain free from falls  Description  Will remain free from falls  Outcome: Ongoing  Goal: Absence of physical injury  Description  Absence of physical injury  Outcome: Ongoing     Problem: Mood - Altered:  Goal: Mood stable  Description  Mood stable  Outcome: Ongoing  Goal: Absence of abusive behavior  Description  Absence of abusive behavior  Outcome: Ongoing  Goal: Verbalizations of feeling emotionally comfortable while being cared for will increase  Description  Verbalizations of feeling emotionally comfortable while being cared for will increase  Outcome: Ongoing     Problem: Psychomotor Activity - Altered:  Goal: Absence of psychomotor disturbance signs and symptoms  Description  Absence of psychomotor disturbance signs and symptoms  Outcome: Ongoing     Problem: Sensory Perception - Impaired:  Goal: Demonstrations of improved sensory functioning will increase  Description  Demonstrations of improved sensory functioning will increase  Outcome: Ongoing  Goal: Decrease in sensory misperception frequency  Description  Decrease in sensory misperception frequency  Outcome: Ongoing  Goal: Able to refrain from responding to false sensory perceptions  Description  Able to refrain from responding to false sensory perceptions  Outcome: Ongoing  Goal: Demonstrates accurate environmental perceptions  Description  Demonstrates accurate environmental perceptions  Outcome: Ongoing  Goal: Able to distinguish between reality-based and nonreality-based thinking  Description  Able to distinguish between reality-based and nonreality-based thinking  Outcome: Ongoing  Goal: Able to interrupt nonreality-based thinking  Description  Able to interrupt nonreality-based thinking  Outcome: Ongoing     Problem: Sleep Pattern Disturbance:  Goal: Appears well-rested  Description  Appears well-rested  Outcome: Ongoing     Problem: Nutrition  Goal: Optimal nutrition therapy  Outcome: Ongoing     Problem: Pain:  Goal: Pain level will decrease  Description  Pain level will decrease  Outcome: Ongoing  Goal: Control of acute pain  Description  Control of acute pain  Outcome: Ongoing  Goal: Control of chronic pain  Description  Control of chronic pain  Outcome: Ongoing Never smoker

## 2024-03-11 NOTE — ED ADULT NURSE NOTE - NSFALLHARMRISKINTERV_ED_ALL_ED

## 2024-03-11 NOTE — ED ADULT NURSE NOTE - CHPI ED NUR SYMPTOMS NEG
no back pain/no chills/no diaphoresis/no dizziness/no fever/no nausea/no shortness of breath/no syncope

## 2024-03-11 NOTE — ED PROVIDER NOTE - PROGRESS NOTE DETAILS
DOMINICATEL: pt with no symptoms here, CE negative x 2, had cardiologist but would like to follow with another cardiologist, will give cardiology on call follow up

## 2024-03-11 NOTE — ED PROVIDER NOTE - PHYSICAL EXAMINATION
GENERAL: well appearing in no acute distress  HEAD: normocephalic, atraumatic  HEENT: normal conjunctiva, oral mucosa moist,   CARDIAC: regular rate and rhythm, no appreciable murmurs, 2+ pulses in UE/LE b/l  PULM:  normal breath sounds, clear to ascultation bilaterally, no rales, rhonchi, wheezing  GI: abdomen nondistended, soft, nontender, no guarding, rebound tenderness  NEURO: AAOx3  MSK: no c/c/e no calf tenderness b/l  SKIN: well-perfused, extremities warm, no visible rashes  PSYCH: appropriate mood and affect

## 2024-03-11 NOTE — ED ADULT NURSE REASSESSMENT NOTE - NS ED NURSE REASSESS COMMENT FT1
Pt resting comfortably in bed at this time, offers no complaints.  Chest pain resolved.  No resp. distress/SOB.  No n/v/d.  Pending dc home.  Pt to follow up out pt with cardio.  Pt and wife expressed understanding of dc instructions.

## 2024-03-11 NOTE — ED PROVIDER NOTE - NSFOLLOWUPINSTRUCTIONS_ED_ALL_ED_FT
1) Follow with Cardiology, call tomorrow  2) Return to ED immediately for chest pain, shortness of breath, dizziness, vomiting or any other concerning symptoms.

## 2024-03-11 NOTE — ED PROVIDER NOTE - CLINICAL SUMMARY MEDICAL DECISION MAKING FREE TEXT BOX
69 y/o male with hx of CAD, multiple stents, followed by Dr. Newton, last stress test 2 months ago, p/w 2 episodes of sharp CP nonradiating, no SOB, no n/v, no fever, no cough, no dizziness, no leg pain, no leg swelling, no recent travel,    Vitals reviewed, Head: atraumatic, PERRLA, EOMI, moist mucous membranes, Neck: supple, Chest: clear to ausculation, Cardiac: regular rate and rhythm, Abdomen: soft and nontender, Extremities: well perfused and without edema, Skin: intact, no visible rash, Psych: appropriate mood, Neuro: AxOx3, no focal neurologic deficit     Ddx includes but not limited to ACS, PE, COVID, PNA   Will get CBC, CMP, CE, CXR, EKG, Nasal swab  Reassess

## 2024-03-11 NOTE — ED PROVIDER NOTE - OBJECTIVE STATEMENT
69 y/o male with hx of CAD, multiple stents, followed by Dr. Newton, last stress test 2 months ago, p/w 2 episodes of sharp CP nonradiating, no SOB, no n/v, no fever, no cough, no dizziness, no leg pain, no leg swelling, no recent travel,

## 2024-03-11 NOTE — ED ADULT NURSE REASSESSMENT NOTE - NSFALLUNIVINTERV_ED_ALL_ED
Bed/Stretcher in lowest position, wheels locked, appropriate side rails in place/Call bell, personal items and telephone in reach/Instruct patient to call for assistance before getting out of bed/chair/stretcher/Non-slip footwear applied when patient is off stretcher/[place] to call system/Physically safe environment - no spills, clutter or unnecessary equipment/Purposeful proactive rounding/Room/bathroom lighting operational, light cord in reach

## 2024-03-11 NOTE — ED ADULT NURSE NOTE - OBJECTIVE STATEMENT
Pt c/o chest pain. Pt states the chest pain was sharp and severe, pt states it went away and then it came back. Pt states he has a hx of MI and stents. EKG done at bedside. Pt placed on bedside cardiac monitor. Pt denies severe chest pain at this time- pt states chest feels sore. Pt denies headache, SOB, fever or chills. Pt resting in stretcher. Pt wife at bedside.

## 2024-03-13 ENCOUNTER — NON-APPOINTMENT (OUTPATIENT)
Age: 69
End: 2024-03-13

## 2024-03-13 ENCOUNTER — APPOINTMENT (OUTPATIENT)
Dept: CARDIOLOGY | Facility: CLINIC | Age: 69
End: 2024-03-13
Payer: MEDICARE

## 2024-03-13 VITALS — SYSTOLIC BLOOD PRESSURE: 128 MMHG | DIASTOLIC BLOOD PRESSURE: 70 MMHG

## 2024-03-13 VITALS — HEIGHT: 69 IN | WEIGHT: 180 LBS | OXYGEN SATURATION: 95 % | BODY MASS INDEX: 26.66 KG/M2 | HEART RATE: 78 BPM

## 2024-03-13 PROCEDURE — 93000 ELECTROCARDIOGRAM COMPLETE: CPT

## 2024-03-13 PROCEDURE — 99205 OFFICE O/P NEW HI 60 MIN: CPT

## 2024-03-13 PROCEDURE — G2211 COMPLEX E/M VISIT ADD ON: CPT

## 2024-03-13 RX ORDER — PEAK FLOW METER
EACH MISCELLANEOUS
Qty: 1 | Refills: 0 | Status: DISCONTINUED | COMMUNITY
Start: 2020-08-24 | End: 2024-03-13

## 2024-03-13 RX ORDER — ALPRAZOLAM 2 MG/1
TABLET ORAL
Refills: 0 | Status: DISCONTINUED | COMMUNITY
End: 2024-03-13

## 2024-03-13 RX ORDER — FLUTICASONE FUROATE 200 UG/1
200 POWDER RESPIRATORY (INHALATION) DAILY
Qty: 7 | Refills: 3 | Status: DISCONTINUED | COMMUNITY
Start: 2020-11-23 | End: 2024-03-13

## 2024-03-13 RX ORDER — DOXYCYCLINE HYCLATE 100 MG/1
100 TABLET ORAL
Qty: 14 | Refills: 0 | Status: DISCONTINUED | COMMUNITY
Start: 2017-12-14 | End: 2024-03-13

## 2024-03-13 RX ORDER — CROMOLYN SODIUM 40 MG/ML
4 SOLUTION/ DROPS OPHTHALMIC
Qty: 10 | Refills: 0 | Status: DISCONTINUED | COMMUNITY
Start: 2017-05-04 | End: 2024-03-13

## 2024-03-13 RX ORDER — TRAMADOL HYDROCHLORIDE 50 MG/1
50 TABLET, COATED ORAL
Qty: 60 | Refills: 0 | Status: DISCONTINUED | COMMUNITY
Start: 2017-10-16 | End: 2024-03-13

## 2024-03-13 RX ORDER — FLUTICASONE FUROATE 50 UG/1
POWDER RESPIRATORY (INHALATION)
Refills: 0 | Status: DISCONTINUED | COMMUNITY
End: 2024-03-13

## 2024-03-13 NOTE — HISTORY OF PRESENT ILLNESS
[FreeTextEntry1] : 68 year old man with CAD s/p MI in 2016 s/p GAURAV LPL, 2018 mLAD s/p PCI, pRCA s/p PCI, HTN, HLD CAROLYN on CPAP presents for an initial cardiac evaluation.   he was formerly seeing Dr. Waddell. Recently went to  ED had sharp midsternal chest pain that was non radiating, non exertional, self limiitng, His ED workup eas essentiall negative. I personally reviewed all of the hospital records available to me at this time, which included but are not limited to the discharge summary, labs and imaging reports.  He has not had any recurrent chest pain. He   denies any  PND, orthopnea, lower extremity edema, near syncope, syncope, stroke like symptoms. He does complaining of dyspnea going up the stair.  He exercises about 6 days a week with stretiching, walking about 1-1.5 miles on the treadmill.

## 2024-03-13 NOTE — DISCUSSION/SUMMARY
[FreeTextEntry1] : 68 year man with a history as listed presents for an initial cardiac evaluation.  Neri recently had an atypical chest pain. His EKG did not reveal any significant ischemic changes. He will undergo a nuclear stress test to reassess his CAD.   He will continue ASA. He will continue with statin therapy to achieve maintain goal LDL<100 or ideally <70. Check cardioIq. Check carotid doppler.  Exercise and diet counseling was performed in order to reduce her future cardiovascular risk. He will followup with me in 3 months  or sooner if necessary.   [EKG obtained to assist in diagnosis and management of assessed problem(s)] : EKG obtained to assist in diagnosis and management of assessed problem(s)

## 2024-04-08 ENCOUNTER — NON-APPOINTMENT (OUTPATIENT)
Age: 69
End: 2024-04-08

## 2024-04-10 ENCOUNTER — APPOINTMENT (OUTPATIENT)
Dept: CARDIOLOGY | Facility: CLINIC | Age: 69
End: 2024-04-10
Payer: MEDICARE

## 2024-04-10 PROCEDURE — A9500: CPT

## 2024-04-10 PROCEDURE — 93015 CV STRESS TEST SUPVJ I&R: CPT

## 2024-04-10 PROCEDURE — 78452 HT MUSCLE IMAGE SPECT MULT: CPT

## 2024-04-24 ENCOUNTER — APPOINTMENT (OUTPATIENT)
Dept: CARDIOLOGY | Facility: CLINIC | Age: 69
End: 2024-04-24
Payer: MEDICARE

## 2024-04-24 PROCEDURE — 93880 EXTRACRANIAL BILAT STUDY: CPT

## 2024-05-01 ENCOUNTER — APPOINTMENT (OUTPATIENT)
Dept: PULMONOLOGY | Facility: CLINIC | Age: 69
End: 2024-05-01
Payer: MEDICARE

## 2024-05-01 ENCOUNTER — NON-APPOINTMENT (OUTPATIENT)
Age: 69
End: 2024-05-01

## 2024-05-01 VITALS
SYSTOLIC BLOOD PRESSURE: 125 MMHG | HEIGHT: 69 IN | DIASTOLIC BLOOD PRESSURE: 81 MMHG | HEART RATE: 94 BPM | WEIGHT: 180 LBS | BODY MASS INDEX: 26.66 KG/M2 | OXYGEN SATURATION: 95 %

## 2024-05-01 DIAGNOSIS — G47.33 OBSTRUCTIVE SLEEP APNEA (ADULT) (PEDIATRIC): ICD-10-CM

## 2024-05-01 PROCEDURE — 99204 OFFICE O/P NEW MOD 45 MIN: CPT

## 2024-05-01 RX ORDER — FLUTICASONE FUROATE 200 UG/1
200 POWDER RESPIRATORY (INHALATION) DAILY
Qty: 3 | Refills: 3 | Status: DISCONTINUED | COMMUNITY
Start: 2020-11-18 | End: 2024-05-01

## 2024-05-04 ENCOUNTER — TRANSCRIPTION ENCOUNTER (OUTPATIENT)
Age: 69
End: 2024-05-04

## 2024-05-04 NOTE — ASSESSMENT
[FreeTextEntry1] : History of obstructive sleep apnea despite this reports daytime sleepiness and nonrestorative sleep.  I have asked the patient to provide me with the serial number from his device so I can look at his data and make a series of adjustments to improve his symptoms.  I have also asked him to provide me with copies of his sleep studies for my review.

## 2024-05-04 NOTE — HISTORY OF PRESENT ILLNESS
[TextBox_4] : Patient referred for evaluation and management of obstructive sleep apnea Long history of CAROLYN for at least 20 years has used multiple CPAP machines.  Most recently machine replaced about 3 years ago currently has ResMed device.  Does not know the settings.  He does report that he continues to report sleepiness despite using CPAP.  He has been on armodafinil 150 mg/day for several years without a tremendous benefit.  He does note when he is off the medication he is somewhat worse. He reports no snoring.  He does report that he uses the machine on a nightly maintenance  Reportedly has history of asthma not currently on medication.

## 2024-05-04 NOTE — ADDENDUM
[FreeTextEntry1] : Records obtained. CT of sinuses demonstrates deviated septum No PSG provide Titration dated November 2021 appears to use AutoPap without any real titration MSLT demonstrates excessive sleepiness with sleep latency of 5 minutes  Access to CPAP machine provided.  Currently on AutoPap 4-20.  Average pressure 6 maximum pressure 10.  Recommend increase in pressure setting once patient provides permission to do

## 2024-05-06 ENCOUNTER — TRANSCRIPTION ENCOUNTER (OUTPATIENT)
Age: 69
End: 2024-05-06

## 2024-06-24 ENCOUNTER — NON-APPOINTMENT (OUTPATIENT)
Age: 69
End: 2024-06-24

## 2024-06-24 ENCOUNTER — APPOINTMENT (OUTPATIENT)
Dept: CARDIOLOGY | Facility: CLINIC | Age: 69
End: 2024-06-24
Payer: MEDICARE

## 2024-06-24 VITALS
OXYGEN SATURATION: 96 % | HEART RATE: 81 BPM | HEIGHT: 69 IN | WEIGHT: 179 LBS | SYSTOLIC BLOOD PRESSURE: 127 MMHG | DIASTOLIC BLOOD PRESSURE: 84 MMHG | BODY MASS INDEX: 26.51 KG/M2

## 2024-06-24 DIAGNOSIS — I25.10 ATHEROSCLEROTIC HEART DISEASE OF NATIVE CORONARY ARTERY W/OUT ANGINA PECTORIS: ICD-10-CM

## 2024-06-24 DIAGNOSIS — G47.30 SLEEP APNEA, UNSPECIFIED: ICD-10-CM

## 2024-06-24 DIAGNOSIS — E78.5 HYPERLIPIDEMIA, UNSPECIFIED: ICD-10-CM

## 2024-06-24 PROCEDURE — 93000 ELECTROCARDIOGRAM COMPLETE: CPT

## 2024-06-24 PROCEDURE — G2211 COMPLEX E/M VISIT ADD ON: CPT

## 2024-06-24 PROCEDURE — 99214 OFFICE O/P EST MOD 30 MIN: CPT

## 2025-01-17 ENCOUNTER — APPOINTMENT (OUTPATIENT)
Dept: CARDIOLOGY | Facility: CLINIC | Age: 70
End: 2025-01-17
Payer: MEDICARE

## 2025-01-17 ENCOUNTER — NON-APPOINTMENT (OUTPATIENT)
Age: 70
End: 2025-01-17

## 2025-01-17 VITALS
SYSTOLIC BLOOD PRESSURE: 142 MMHG | WEIGHT: 179 LBS | OXYGEN SATURATION: 97 % | DIASTOLIC BLOOD PRESSURE: 84 MMHG | BODY MASS INDEX: 26.51 KG/M2 | HEART RATE: 63 BPM | HEIGHT: 69 IN

## 2025-01-17 DIAGNOSIS — R07.9 CHEST PAIN, UNSPECIFIED: ICD-10-CM

## 2025-01-17 DIAGNOSIS — E78.5 HYPERLIPIDEMIA, UNSPECIFIED: ICD-10-CM

## 2025-01-17 DIAGNOSIS — I25.10 ATHEROSCLEROTIC HEART DISEASE OF NATIVE CORONARY ARTERY W/OUT ANGINA PECTORIS: ICD-10-CM

## 2025-01-17 PROCEDURE — G2211 COMPLEX E/M VISIT ADD ON: CPT

## 2025-01-17 PROCEDURE — 99214 OFFICE O/P EST MOD 30 MIN: CPT

## 2025-01-17 PROCEDURE — 93000 ELECTROCARDIOGRAM COMPLETE: CPT

## 2025-01-17 RX ORDER — PANTOPRAZOLE 40 MG/1
40 TABLET, DELAYED RELEASE ORAL
Qty: 30 | Refills: 0 | Status: ACTIVE | COMMUNITY
Start: 2025-01-17 | End: 1900-01-01

## 2025-02-11 ENCOUNTER — APPOINTMENT (OUTPATIENT)
Dept: CARDIOLOGY | Facility: CLINIC | Age: 70
End: 2025-02-11
Payer: MEDICARE

## 2025-02-11 PROCEDURE — 93306 TTE W/DOPPLER COMPLETE: CPT

## 2025-04-25 ENCOUNTER — APPOINTMENT (OUTPATIENT)
Dept: CARDIOLOGY | Facility: CLINIC | Age: 70
End: 2025-04-25

## 2025-04-25 ENCOUNTER — APPOINTMENT (OUTPATIENT)
Dept: CARDIOLOGY | Facility: CLINIC | Age: 70
End: 2025-04-25
Payer: MEDICARE

## 2025-04-25 ENCOUNTER — NON-APPOINTMENT (OUTPATIENT)
Age: 70
End: 2025-04-25

## 2025-04-25 VITALS — DIASTOLIC BLOOD PRESSURE: 78 MMHG | SYSTOLIC BLOOD PRESSURE: 118 MMHG

## 2025-04-25 VITALS
BODY MASS INDEX: 26.96 KG/M2 | WEIGHT: 182 LBS | HEIGHT: 69 IN | OXYGEN SATURATION: 96 % | SYSTOLIC BLOOD PRESSURE: 131 MMHG | DIASTOLIC BLOOD PRESSURE: 84 MMHG | HEART RATE: 95 BPM

## 2025-04-25 DIAGNOSIS — R25.2 CRAMP AND SPASM: ICD-10-CM

## 2025-04-25 DIAGNOSIS — I25.10 ATHEROSCLEROTIC HEART DISEASE OF NATIVE CORONARY ARTERY W/OUT ANGINA PECTORIS: ICD-10-CM

## 2025-04-25 DIAGNOSIS — E78.5 HYPERLIPIDEMIA, UNSPECIFIED: ICD-10-CM

## 2025-04-25 PROCEDURE — 93000 ELECTROCARDIOGRAM COMPLETE: CPT

## 2025-04-25 PROCEDURE — G2211 COMPLEX E/M VISIT ADD ON: CPT

## 2025-04-25 PROCEDURE — 99214 OFFICE O/P EST MOD 30 MIN: CPT

## 2025-05-01 ENCOUNTER — APPOINTMENT (OUTPATIENT)
Dept: CARDIOLOGY | Facility: CLINIC | Age: 70
End: 2025-05-01

## 2025-06-02 ENCOUNTER — APPOINTMENT (OUTPATIENT)
Age: 70
End: 2025-06-02

## 2025-06-11 ENCOUNTER — APPOINTMENT (OUTPATIENT)
Dept: CARDIOLOGY | Facility: CLINIC | Age: 70
End: 2025-06-11
Payer: MEDICARE

## 2025-06-11 PROCEDURE — 93925 LOWER EXTREMITY STUDY: CPT

## 2025-06-16 RX ORDER — TIRZEPATIDE 2.5 MG/.5ML
2.5 INJECTION, SOLUTION SUBCUTANEOUS
Qty: 4 | Refills: 0 | Status: ACTIVE | COMMUNITY
Start: 2025-06-16

## 2025-07-01 ENCOUNTER — NON-APPOINTMENT (OUTPATIENT)
Age: 70
End: 2025-07-01

## 2025-07-01 NOTE — PRE-ANESTHESIA EVALUATION ADULT - NSANTHRISKNONERD_GEN_ALL_CORE
Most people get better in 10 to 14 days. You may continue to cough for 2 to 3 weeks. Colds are caused by viruses and do not get better with antibiotics.  Any over-the-counter medications will help relieve your symptoms.  Mucinex D, this can be obtained from the pharmacist and a 's license is needed to purchase this.  Start taking one of the following allergy medications Zyrtec, Claritin or Xyzal daily  Cough syrup such as Delsym or Robitussin can help  Drink plenty of fluids  Rest, Tylenol and Motrin for aches and pains  Return for worsening of symptoms, or any other concerns     No risk alerts present

## 2025-07-25 NOTE — H&P CARDIOLOGY - DATE:
Attempted to call patient in regards to his medication concerns. Received a message that the caller does not have their voicemail set up yet.   
Patient called in and wanted to let Dr. Pappas know that he is taking Escitalopram. Patient wants to make sure that this won't interact with WELLBUTRIN and naltrexone.    Patient also stated that one of the medications is making him tired.    Patient is requesting a call back regarding this at .    Thank you  
Patient returned call and was provided with pcp message above. Patient expressed understanding and there are no further questions or concerns at this time.   
Please call the patient and let him know that the medications are fine for him to take together.  He needs to expand the more energy in order to get more energy.  If his energy level increases, he will be less tired.  Lets give it a couple weeks and see if his energy level does not improve.    I am happy to talk to him about this when I see him next month.    Geronimo  
[birth year]-Héctor-2020

## 2025-07-28 ENCOUNTER — APPOINTMENT (OUTPATIENT)
Dept: NEUROSURGERY | Facility: CLINIC | Age: 70
End: 2025-07-28
Payer: MEDICARE

## 2025-07-28 DIAGNOSIS — M79.606 PAIN IN LEG, UNSPECIFIED: ICD-10-CM

## 2025-07-28 PROCEDURE — 99204 OFFICE O/P NEW MOD 45 MIN: CPT

## 2025-07-29 ENCOUNTER — APPOINTMENT (OUTPATIENT)
Dept: CARDIOLOGY | Facility: CLINIC | Age: 70
End: 2025-07-29
Payer: MEDICARE

## 2025-07-29 VITALS
WEIGHT: 185 LBS | HEIGHT: 69 IN | HEART RATE: 70 BPM | SYSTOLIC BLOOD PRESSURE: 128 MMHG | BODY MASS INDEX: 27.4 KG/M2 | OXYGEN SATURATION: 92 % | DIASTOLIC BLOOD PRESSURE: 82 MMHG

## 2025-07-29 VITALS — DIASTOLIC BLOOD PRESSURE: 78 MMHG | SYSTOLIC BLOOD PRESSURE: 118 MMHG

## 2025-07-29 DIAGNOSIS — G47.33 OBSTRUCTIVE SLEEP APNEA (ADULT) (PEDIATRIC): ICD-10-CM

## 2025-07-29 DIAGNOSIS — I25.10 ATHEROSCLEROTIC HEART DISEASE OF NATIVE CORONARY ARTERY W/OUT ANGINA PECTORIS: ICD-10-CM

## 2025-07-29 DIAGNOSIS — G47.30 SLEEP APNEA, UNSPECIFIED: ICD-10-CM

## 2025-07-29 PROCEDURE — 99214 OFFICE O/P EST MOD 30 MIN: CPT

## 2025-07-29 RX ORDER — PREGABALIN 75 MG/1
75 CAPSULE ORAL
Qty: 30 | Refills: 1 | Status: COMPLETED | COMMUNITY
Start: 2025-07-28 | End: 2025-07-29

## 2025-08-04 ENCOUNTER — NON-APPOINTMENT (OUTPATIENT)
Age: 70
End: 2025-08-04

## 2025-08-07 RX ORDER — TIRZEPATIDE 2.5 MG/.5ML
2.5 INJECTION, SOLUTION SUBCUTANEOUS
Qty: 4 | Refills: 1 | Status: ACTIVE | COMMUNITY
Start: 2025-07-29 | End: 1900-01-01

## 2025-09-15 ENCOUNTER — APPOINTMENT (OUTPATIENT)
Age: 70
End: 2025-09-15
Payer: MEDICARE

## 2025-09-15 DIAGNOSIS — M79.606 PAIN IN LEG, UNSPECIFIED: ICD-10-CM

## 2025-09-15 PROCEDURE — 99214 OFFICE O/P EST MOD 30 MIN: CPT
